# Patient Record
Sex: FEMALE | Race: BLACK OR AFRICAN AMERICAN | NOT HISPANIC OR LATINO | Employment: OTHER | URBAN - METROPOLITAN AREA
[De-identification: names, ages, dates, MRNs, and addresses within clinical notes are randomized per-mention and may not be internally consistent; named-entity substitution may affect disease eponyms.]

---

## 2022-02-15 ENCOUNTER — HOSPITAL ENCOUNTER (OUTPATIENT)
Dept: RADIOLOGY | Facility: HOSPITAL | Age: 67
Discharge: HOME | End: 2022-02-15
Attending: NURSE PRACTITIONER
Payer: MEDICARE

## 2022-02-15 DIAGNOSIS — M25.531 RIGHT WRIST PAIN: ICD-10-CM

## 2022-02-15 DIAGNOSIS — R29.898 DEFICIENCIES OF LIMBS: ICD-10-CM

## 2022-02-15 DIAGNOSIS — M25.531 RIGHT WRIST PAIN: Primary | ICD-10-CM

## 2022-02-15 PROCEDURE — 73110 X-RAY EXAM OF WRIST: CPT | Mod: TC,RT

## 2022-02-16 DIAGNOSIS — R42 DIZZINESS: Primary | ICD-10-CM

## 2022-02-17 DIAGNOSIS — R42 DIZZINESS: Primary | ICD-10-CM

## 2022-02-17 DIAGNOSIS — R42 DIZZINESS AND GIDDINESS: Primary | ICD-10-CM

## 2022-02-17 DIAGNOSIS — R26.81 GAIT INSTABILITY: ICD-10-CM

## 2022-02-17 DIAGNOSIS — R29.6 FREQUENT FALLS: ICD-10-CM

## 2022-02-23 ENCOUNTER — CLINICAL SUPPORT (OUTPATIENT)
Dept: CARDIOLOGY | Facility: HOSPITAL | Age: 67
End: 2022-02-23
Attending: NURSE PRACTITIONER
Payer: MEDICARE

## 2022-02-23 DIAGNOSIS — R42 DIZZINESS: ICD-10-CM

## 2022-02-23 LAB
AORTIC ROOT ANNULUS: 3.32 CM
AORTIC VALVE CUSP SEPERATION: 2.12 CM
AV INDEX (PROSTH): 1.02
AV MEAN GRADIENT: 5 MMHG
AV PEAK GRADIENT: 12 MMHG
AV VALVE AREA: 3.53 CM2
AV VELOCITY RATIO: 85.2
CV ECHO LV RWT: 0.44 CM
DOP CALC AO PEAK VEL: 1.71 M/S
DOP CALC AO VTI: 30.53 CM
DOP CALC LVOT AREA: 3.5 CM2
DOP CALC LVOT DIAMETER: 2.1 CM
DOP CALC LVOT PEAK VEL: 145.69 M/S
DOP CALC LVOT STROKE VOLUME: 107.66 CM3
DOP CALCLVOT PEAK VEL VTI: 31.1 CM
E WAVE DECELERATION TIME: 174.04 MSEC
E/A RATIO: 1.22
E/E' RATIO: 9.26 M/S
ECHO LV POSTERIOR WALL: 0.94 CM (ref 0.6–1.1)
EJECTION FRACTION: 60 %
FRACTIONAL SHORTENING: 40 % (ref 28–44)
INTERVENTRICULAR SEPTUM: 0.98 CM (ref 0.6–1.1)
IVC DIAMETER: 1.6 CM
LEFT ATRIUM SIZE: 3.12 CM
LEFT INTERNAL DIMENSION IN SYSTOLE: 2.55 CM (ref 2.1–4)
LEFT VENTRICLE DIASTOLIC VOLUME: 125 ML
LEFT VENTRICLE SYSTOLIC VOLUME: 54.6 ML
LEFT VENTRICULAR INTERNAL DIMENSION IN DIASTOLE: 4.28 CM (ref 3.5–6)
LEFT VENTRICULAR MASS: 133.66 G
LV LATERAL E/E' RATIO: 7.33 M/S
LV SEPTAL E/E' RATIO: 12.57 M/S
MV PEAK A VEL: 0.72 M/S
MV PEAK E VEL: 0.88 M/S
PISA TR MAX VEL: 2.64 M/S
PV PEAK VELOCITY: 132 CM/S
RA PRESSURE: 3 MMHG
RIGHT VENTRICULAR END-DIASTOLIC DIMENSION: 189 CM
TDI LATERAL: 0.12 M/S
TDI SEPTAL: 0.07 M/S
TDI: 0.1 M/S
TR MAX PG: 28 MMHG
TV REST PULMONARY ARTERY PRESSURE: 31 MMHG

## 2022-02-23 PROCEDURE — 93306 TTE W/DOPPLER COMPLETE: CPT

## 2022-02-23 PROCEDURE — 93306 ECHO (CUPID ONLY): ICD-10-PCS | Mod: 26,,, | Performed by: INTERNAL MEDICINE

## 2022-02-23 PROCEDURE — 93306 TTE W/DOPPLER COMPLETE: CPT | Mod: 26,,, | Performed by: INTERNAL MEDICINE

## 2022-03-02 ENCOUNTER — HOSPITAL ENCOUNTER (OUTPATIENT)
Dept: RADIOLOGY | Facility: HOSPITAL | Age: 67
Discharge: HOME | End: 2022-03-02
Attending: NURSE PRACTITIONER
Payer: MEDICARE

## 2022-03-02 DIAGNOSIS — R42 DIZZINESS: ICD-10-CM

## 2022-03-02 PROCEDURE — 70551 MRI BRAIN STEM W/O DYE: CPT | Mod: TC,PO

## 2022-03-02 PROCEDURE — 70544 MR ANGIOGRAPHY HEAD W/O DYE: CPT | Mod: TC,PO

## 2022-03-04 DIAGNOSIS — R42 DIZZINESS: Primary | ICD-10-CM

## 2022-03-04 DIAGNOSIS — R42 SEVERE VERTIGO: ICD-10-CM

## 2022-03-08 DIAGNOSIS — R42 DIZZINESS: Primary | ICD-10-CM

## 2022-03-08 DIAGNOSIS — R09.89 SYMPTOMS INVOLVING CARDIOVASCULAR SYSTEM: ICD-10-CM

## 2022-03-08 DIAGNOSIS — R29.6 FALLS FREQUENTLY: ICD-10-CM

## 2022-03-08 DIAGNOSIS — R26.81 GAIT INSTABILITY: ICD-10-CM

## 2022-03-08 DIAGNOSIS — J34.89 PAIN, NOSE: ICD-10-CM

## 2022-03-08 DIAGNOSIS — R42 VERTIGO: ICD-10-CM

## 2022-03-09 DIAGNOSIS — R42 DIZZINESS: Primary | ICD-10-CM

## 2022-03-10 ENCOUNTER — OFFICE VISIT (OUTPATIENT)
Dept: ORTHOPEDICS | Facility: CLINIC | Age: 67
End: 2022-03-10
Payer: MEDICARE

## 2022-03-10 VITALS
BODY MASS INDEX: 34.99 KG/M2 | WEIGHT: 210 LBS | DIASTOLIC BLOOD PRESSURE: 77 MMHG | SYSTOLIC BLOOD PRESSURE: 135 MMHG | HEIGHT: 65 IN

## 2022-03-10 DIAGNOSIS — M65.4 DE QUERVAIN'S TENOSYNOVITIS, RIGHT: ICD-10-CM

## 2022-03-10 DIAGNOSIS — M18.11 ARTHRITIS OF CARPOMETACARPAL (CMC) JOINT OF RIGHT THUMB: Primary | ICD-10-CM

## 2022-03-10 PROCEDURE — 3288F PR FALLS RISK ASSESSMENT DOCUMENTED: ICD-10-PCS | Mod: S$GLB,,, | Performed by: ORTHOPAEDIC SURGERY

## 2022-03-10 PROCEDURE — 3078F DIAST BP <80 MM HG: CPT | Mod: S$GLB,,, | Performed by: ORTHOPAEDIC SURGERY

## 2022-03-10 PROCEDURE — 1100F PR PT FALLS ASSESS DOC 2+ FALLS/FALL W/INJURY/YR: ICD-10-PCS | Mod: S$GLB,,, | Performed by: ORTHOPAEDIC SURGERY

## 2022-03-10 PROCEDURE — 3078F PR MOST RECENT DIASTOLIC BLOOD PRESSURE < 80 MM HG: ICD-10-PCS | Mod: S$GLB,,, | Performed by: ORTHOPAEDIC SURGERY

## 2022-03-10 PROCEDURE — 4010F PR ACE/ARB THEARPY RXD/TAKEN: ICD-10-PCS | Mod: S$GLB,,, | Performed by: ORTHOPAEDIC SURGERY

## 2022-03-10 PROCEDURE — 1159F PR MEDICATION LIST DOCUMENTED IN MEDICAL RECORD: ICD-10-PCS | Mod: S$GLB,,, | Performed by: ORTHOPAEDIC SURGERY

## 2022-03-10 PROCEDURE — 20550 TENDON SHEATH: ICD-10-PCS | Mod: RT,S$GLB,, | Performed by: ORTHOPAEDIC SURGERY

## 2022-03-10 PROCEDURE — 20550 NJX 1 TENDON SHEATH/LIGAMENT: CPT | Mod: RT,S$GLB,, | Performed by: ORTHOPAEDIC SURGERY

## 2022-03-10 PROCEDURE — 3008F BODY MASS INDEX DOCD: CPT | Mod: S$GLB,,, | Performed by: ORTHOPAEDIC SURGERY

## 2022-03-10 PROCEDURE — 1159F MED LIST DOCD IN RCRD: CPT | Mod: S$GLB,,, | Performed by: ORTHOPAEDIC SURGERY

## 2022-03-10 PROCEDURE — 1160F PR REVIEW ALL MEDS BY PRESCRIBER/CLIN PHARMACIST DOCUMENTED: ICD-10-PCS | Mod: S$GLB,,, | Performed by: ORTHOPAEDIC SURGERY

## 2022-03-10 PROCEDURE — 3075F SYST BP GE 130 - 139MM HG: CPT | Mod: S$GLB,,, | Performed by: ORTHOPAEDIC SURGERY

## 2022-03-10 PROCEDURE — 99203 OFFICE O/P NEW LOW 30 MIN: CPT | Mod: 25,S$GLB,, | Performed by: ORTHOPAEDIC SURGERY

## 2022-03-10 PROCEDURE — 99203 PR OFFICE/OUTPT VISIT, NEW, LEVL III, 30-44 MIN: ICD-10-PCS | Mod: 25,S$GLB,, | Performed by: ORTHOPAEDIC SURGERY

## 2022-03-10 PROCEDURE — 1100F PTFALLS ASSESS-DOCD GE2>/YR: CPT | Mod: S$GLB,,, | Performed by: ORTHOPAEDIC SURGERY

## 2022-03-10 PROCEDURE — 3075F PR MOST RECENT SYSTOLIC BLOOD PRESS GE 130-139MM HG: ICD-10-PCS | Mod: S$GLB,,, | Performed by: ORTHOPAEDIC SURGERY

## 2022-03-10 PROCEDURE — 4010F ACE/ARB THERAPY RXD/TAKEN: CPT | Mod: S$GLB,,, | Performed by: ORTHOPAEDIC SURGERY

## 2022-03-10 PROCEDURE — 3008F PR BODY MASS INDEX (BMI) DOCUMENTED: ICD-10-PCS | Mod: S$GLB,,, | Performed by: ORTHOPAEDIC SURGERY

## 2022-03-10 PROCEDURE — 1125F AMNT PAIN NOTED PAIN PRSNT: CPT | Mod: S$GLB,,, | Performed by: ORTHOPAEDIC SURGERY

## 2022-03-10 PROCEDURE — 1160F RVW MEDS BY RX/DR IN RCRD: CPT | Mod: S$GLB,,, | Performed by: ORTHOPAEDIC SURGERY

## 2022-03-10 PROCEDURE — 1125F PR PAIN SEVERITY QUANTIFIED, PAIN PRESENT: ICD-10-PCS | Mod: S$GLB,,, | Performed by: ORTHOPAEDIC SURGERY

## 2022-03-10 PROCEDURE — 3288F FALL RISK ASSESSMENT DOCD: CPT | Mod: S$GLB,,, | Performed by: ORTHOPAEDIC SURGERY

## 2022-03-10 RX ORDER — ROSUVASTATIN CALCIUM 10 MG/1
10 TABLET, COATED ORAL NIGHTLY
COMMUNITY
Start: 2022-02-11

## 2022-03-10 RX ORDER — MECLIZINE HYDROCHLORIDE 25 MG/1
TABLET ORAL
COMMUNITY

## 2022-03-10 RX ORDER — TRIAMCINOLONE ACETONIDE 40 MG/ML
40 INJECTION, SUSPENSION INTRA-ARTICULAR; INTRAMUSCULAR
Status: DISCONTINUED | OUTPATIENT
Start: 2022-03-10 | End: 2022-03-10 | Stop reason: HOSPADM

## 2022-03-10 RX ORDER — OMEPRAZOLE 10 MG/1
10 CAPSULE, DELAYED RELEASE ORAL EVERY MORNING
COMMUNITY
End: 2022-07-27

## 2022-03-10 RX ORDER — CLONAZEPAM 0.5 MG/1
TABLET ORAL
COMMUNITY
Start: 2021-11-27

## 2022-03-10 RX ORDER — CYCLOSPORINE 0.5 MG/ML
EMULSION OPHTHALMIC
COMMUNITY

## 2022-03-10 RX ORDER — ALBUTEROL SULFATE 90 UG/1
1 AEROSOL, METERED RESPIRATORY (INHALATION) EVERY 4 HOURS PRN
COMMUNITY
Start: 2022-02-11

## 2022-03-10 RX ORDER — TELMISARTAN 40 MG/1
TABLET ORAL
COMMUNITY

## 2022-03-10 RX ORDER — SERTRALINE HYDROCHLORIDE 100 MG/1
TABLET, FILM COATED ORAL
COMMUNITY
End: 2022-08-01

## 2022-03-10 RX ORDER — TRAZODONE HYDROCHLORIDE 100 MG/1
TABLET ORAL
COMMUNITY

## 2022-03-10 RX ORDER — PHENYLPROPANOLAMINE/CLEMASTINE 75-1.34MG
TABLET, EXTENDED RELEASE ORAL
COMMUNITY

## 2022-03-10 RX ORDER — PANTOPRAZOLE SODIUM 20 MG/1
20 TABLET, DELAYED RELEASE ORAL DAILY
COMMUNITY
Start: 2022-02-11 | End: 2022-08-23 | Stop reason: SDUPTHER

## 2022-03-10 RX ADMIN — TRIAMCINOLONE ACETONIDE 40 MG: 40 INJECTION, SUSPENSION INTRA-ARTICULAR; INTRAMUSCULAR at 08:03

## 2022-03-10 NOTE — PROCEDURES
Tendon Sheath    Date/Time: 3/10/2022 8:00 AM  Performed by: Roque Alves MD  Authorized by: Roque Alves MD     Consent Done?:  Yes (Verbal)  Indications:  Pain  Site marked: the procedure site was marked    Timeout: prior to procedure the correct patient, procedure, and site was verified    Prep: patient was prepped and draped in usual sterile fashion      Local anesthesia used?: Yes    Anesthesia:  Local infiltration  Local anesthetic:  Lidocaine 1% without epinephrine  Location:  Wrist  Site:  R first doral compartment  Ultrasonic guidance for needle placement?: No    Needle size:  25 G  Medications:  40 mg triamcinolone acetonide 40 mg/mL  Patient tolerance:  Patient tolerated the procedure well with no immediate complications

## 2022-03-10 NOTE — PROGRESS NOTES
[facility]    Subjective:     Chief Complaint:   Chief Complaint   Patient presents with    Right Wrist - Injury, Pain     Fell on hand 02/15/22. Since fall, she has been unable to comfortably flex and extend wrist. States pain radiates up her arm         Past Medical History:   Diagnosis Date    Carpal tunnel syndrome     Diverticulitis        Past Surgical History:   Procedure Laterality Date    ABDOMINAL SURGERY      bladder lift    CARPAL TUNNEL RELEASE      RT CTR    cystomoty      HERNIA MESH REMOVAL N/A     HYSTERECTOMY      KNEE SURGERY      BL TKA    SHOULDER SURGERY      TSA right       Current Outpatient Medications   Medication Sig    albuterol (PROVENTIL/VENTOLIN HFA) 90 mcg/actuation inhaler 1 puff every 4 (four) hours as needed.    clonazePAM (KLONOPIN) 0.5 MG tablet     cycloSPORINE (RESTASIS) 0.05 % ophthalmic emulsion 1 drop into affected eye    ibuprofen 200 mg Cap ibuprofen    meclizine (ANTIVERT) 25 mg tablet 1 tablet as needed    omeprazole (PRILOSEC) 10 MG capsule omeprazole    pantoprazole (PROTONIX) 20 MG tablet TAKE 1 TABLET BY MOUTH AS NEEDED FOR 90 DAYS    potassium 99 mg Tab potassium    rosuvastatin (CRESTOR) 10 MG tablet Take 10 mg by mouth every evening.    sertraline (ZOLOFT) 100 MG tablet 1 tablet    telmisartan (MICARDIS) 40 MG Tab 1 tablet    traZODone (DESYREL) 100 MG tablet 1 tablet at bedtime    vitamin B complex (B COMPLEX 1 ORAL) B Complex     No current facility-administered medications for this visit.       Review of patient's allergies indicates:   Allergen Reactions    Codeine      Other reaction(s): Unknown    Penicillins      Other reaction(s): Unknown       Family History   Family history unknown: Yes       Social History     Socioeconomic History    Marital status: Single   Tobacco Use    Smoking status: Never Smoker    Smokeless tobacco: Never Used   Substance and Sexual Activity    Alcohol use: Never    Drug use: Never        History of present illness:  Ms. Acosta presents to the clinic today as a new patient with a chief complaint of right wrist/hand pain after a fall approximately 2 months ago.  She reports she fell backwards and struck the dorsum of her right hand when she fell.    Review of Systems:    Constitution: Negative for chills, fever, and sweats.  Negative for unexplained weight loss.    HENT:  Negative for headaches and blurry vision.    Cardiovascular:Negative for chest pain or irregular heart beat. Negative for hypertension.    Respiratory:  Negative for cough and shortness of breath.    Gastrointestinal: Negative for abdominal pain, heartburn, melena, nausea, and vomitting.    Genitourinary:  Negative bladder incontinence and dysuria.    Musculoskeletal:  See HPI for details.     Neurological: Negative for numbness.    Psychiatric/Behavioral: Negative for depression.  The patient is not nervous/anxious.      Endocrine: Negative for polyuria    Hematologic/Lymphatic: Negative for bleeding problem.  Does not bruise/bleed easily.    Skin: Negative for poor would healing and rash    Objective:      Physical Examination:    Vital Signs:    Vitals:    03/10/22 0746   BP: 135/77       Body mass index is 34.95 kg/m².    This a well-developed, well nourished patient in no acute distress.  They are alert and oriented and cooperative to examination.        Right hand exam:  Skin to right hand is clean dry and intact.  There is no erythema or ecchymosis.  There are no signs or symptoms of infection.  Patient is neurovascularly intact throughout the right upper extremity.  She can fully open and close right hand into a fist.  She can oppose her right thumb to all digits in her right hand.   strength in her right hand with grade 5/5 and is equal bilaterally.  She has no anatomical snuffbox tenderness.  She does have tenderness along the 1st dorsal compartment of her right thumb.  She does have a positive Finkelstein's.  She  "also has tenderness to palpation about the right thumb CMC joint.  She does have a positive basilar grind as well.    Pertinent New Results:    XRAY Report / Interpretation:   Three views were taken of her right hand today:  AP, lateral oblique views.  They reveal no acute fractures or dislocations.  Visualized soft tissues are unremarkable.  Patient does have mild arthrosis of the CMC joint of her right thumb.    Assessment/Plan:      1.  Right thumb basilar arthrosis.  2.  Right thumb de Quervain tenosynovitis.    Patient has signs and symptoms consistent with both diagnoses listed above.  Dr. Alves injected both areas today with 40 mg of Kenalog and lidocaine.  She tolerated this well.  We will also place her in a carpal tunnel brace to rest/immobilize the wrist for 1 week.  We will have her follow-up in 1 month if she is not improved.    Brian Caro, Physician Assistant, served in the capacity as a "scribe" for this patient encounter.  A "face-to-face" encounter occurred with Dr. Roque Alves on this date.  The treatment plan and medical decision-making is outlined above. Patient was seen and examined with a chaperone.       This note was created using Dragon voice recognition software that occasionally misinterpreted phrases or words.          "

## 2022-03-15 ENCOUNTER — HOSPITAL ENCOUNTER (OUTPATIENT)
Dept: RADIOLOGY | Facility: HOSPITAL | Age: 67
Discharge: HOME | End: 2022-03-15
Attending: NURSE PRACTITIONER
Payer: MEDICARE

## 2022-03-15 DIAGNOSIS — R26.81 GAIT INSTABILITY: ICD-10-CM

## 2022-03-15 DIAGNOSIS — R29.6 FALLS FREQUENTLY: ICD-10-CM

## 2022-03-15 DIAGNOSIS — R42 DIZZINESS: ICD-10-CM

## 2022-03-15 DIAGNOSIS — R42 VERTIGO: ICD-10-CM

## 2022-03-15 DIAGNOSIS — J34.89 PAIN, NOSE: ICD-10-CM

## 2022-03-15 DIAGNOSIS — R09.89 SYMPTOMS INVOLVING CARDIOVASCULAR SYSTEM: ICD-10-CM

## 2022-03-15 PROCEDURE — 93880 EXTRACRANIAL BILAT STUDY: CPT | Mod: TC,PO

## 2022-03-24 ENCOUNTER — TELEPHONE (OUTPATIENT)
Dept: CARDIOLOGY | Facility: HOSPITAL | Age: 67
End: 2022-03-24
Payer: MEDICAID

## 2022-03-25 ENCOUNTER — CLINICAL SUPPORT (OUTPATIENT)
Dept: CARDIOLOGY | Facility: HOSPITAL | Age: 67
End: 2022-03-25
Attending: NURSE PRACTITIONER
Payer: MEDICARE

## 2022-03-25 DIAGNOSIS — R42 SEVERE VERTIGO: ICD-10-CM

## 2022-03-25 DIAGNOSIS — R42 DIZZINESS: ICD-10-CM

## 2022-03-25 PROCEDURE — 93306 TTE W/DOPPLER COMPLETE: CPT | Mod: 26,,, | Performed by: INTERNAL MEDICINE

## 2022-03-25 PROCEDURE — 93306 ECHO (CUPID ONLY): ICD-10-PCS | Mod: 26,,, | Performed by: INTERNAL MEDICINE

## 2022-03-25 PROCEDURE — 93306 TTE W/DOPPLER COMPLETE: CPT

## 2022-03-30 LAB
AORTIC ROOT ANNULUS: 2.92 CM
AORTIC VALVE CUSP SEPERATION: 1.87 CM
AV INDEX (PROSTH): 0.73
AV MEAN GRADIENT: 9 MMHG
AV PEAK GRADIENT: 19 MMHG
AV VALVE AREA: 2.31 CM2
AV VELOCITY RATIO: 70.45
CV ECHO LV RWT: 0.41 CM
DOP CALC AO PEAK VEL: 2.16 M/S
DOP CALC AO VTI: 43.08 CM
DOP CALC LVOT AREA: 3.2 CM2
DOP CALC LVOT DIAMETER: 2.01 CM
DOP CALC LVOT PEAK VEL: 152.17 M/S
DOP CALC LVOT STROKE VOLUME: 99.36 CM3
DOP CALCLVOT PEAK VEL VTI: 31.33 CM
E WAVE DECELERATION TIME: 207.19 MSEC
E/A RATIO: 1.54
E/E' RATIO: 12.13 M/S
ECHO LV POSTERIOR WALL: 1.02 CM (ref 0.6–1.1)
EJECTION FRACTION: 60 %
FRACTIONAL SHORTENING: 33 % (ref 28–44)
INTERVENTRICULAR SEPTUM: 1.06 CM (ref 0.6–1.1)
LEFT ATRIUM SIZE: 3.73 CM
LEFT INTERNAL DIMENSION IN SYSTOLE: 3.33 CM (ref 2.1–4)
LEFT VENTRICULAR INTERNAL DIMENSION IN DIASTOLE: 4.94 CM (ref 3.5–6)
LEFT VENTRICULAR MASS: 188.13 G
LV LATERAL E/E' RATIO: 9.1 M/S
LV SEPTAL E/E' RATIO: 18.2 M/S
MV PEAK A VEL: 0.59 M/S
MV PEAK E VEL: 0.91 M/S
PISA TR MAX VEL: 2.33 M/S
PV PEAK VELOCITY: 105.6 CM/S
RA PRESSURE: 3 MMHG
RIGHT VENTRICULAR END-DIASTOLIC DIMENSION: 245 CM
TDI LATERAL: 0.1 M/S
TDI SEPTAL: 0.05 M/S
TDI: 0.08 M/S
TR MAX PG: 22 MMHG
TV REST PULMONARY ARTERY PRESSURE: 25 MMHG

## 2022-05-05 ENCOUNTER — OFFICE VISIT (OUTPATIENT)
Dept: ORTHOPEDICS | Facility: CLINIC | Age: 67
End: 2022-05-05
Payer: MEDICARE

## 2022-05-05 VITALS — WEIGHT: 210 LBS | HEIGHT: 65 IN | BODY MASS INDEX: 34.99 KG/M2

## 2022-05-05 DIAGNOSIS — M75.42 IMPINGEMENT SYNDROME OF LEFT SHOULDER: Primary | ICD-10-CM

## 2022-05-05 PROCEDURE — 1159F PR MEDICATION LIST DOCUMENTED IN MEDICAL RECORD: ICD-10-PCS | Mod: CPTII,S$GLB,, | Performed by: ORTHOPAEDIC SURGERY

## 2022-05-05 PROCEDURE — 1159F MED LIST DOCD IN RCRD: CPT | Mod: CPTII,S$GLB,, | Performed by: ORTHOPAEDIC SURGERY

## 2022-05-05 PROCEDURE — 3288F PR FALLS RISK ASSESSMENT DOCUMENTED: ICD-10-PCS | Mod: CPTII,S$GLB,, | Performed by: ORTHOPAEDIC SURGERY

## 2022-05-05 PROCEDURE — 1125F PR PAIN SEVERITY QUANTIFIED, PAIN PRESENT: ICD-10-PCS | Mod: CPTII,S$GLB,, | Performed by: ORTHOPAEDIC SURGERY

## 2022-05-05 PROCEDURE — 3008F PR BODY MASS INDEX (BMI) DOCUMENTED: ICD-10-PCS | Mod: CPTII,S$GLB,, | Performed by: ORTHOPAEDIC SURGERY

## 2022-05-05 PROCEDURE — 3008F BODY MASS INDEX DOCD: CPT | Mod: CPTII,S$GLB,, | Performed by: ORTHOPAEDIC SURGERY

## 2022-05-05 PROCEDURE — 4010F PR ACE/ARB THEARPY RXD/TAKEN: ICD-10-PCS | Mod: CPTII,S$GLB,, | Performed by: ORTHOPAEDIC SURGERY

## 2022-05-05 PROCEDURE — 1100F PR PT FALLS ASSESS DOC 2+ FALLS/FALL W/INJURY/YR: ICD-10-PCS | Mod: CPTII,S$GLB,, | Performed by: ORTHOPAEDIC SURGERY

## 2022-05-05 PROCEDURE — 3288F FALL RISK ASSESSMENT DOCD: CPT | Mod: CPTII,S$GLB,, | Performed by: ORTHOPAEDIC SURGERY

## 2022-05-05 PROCEDURE — 1100F PTFALLS ASSESS-DOCD GE2>/YR: CPT | Mod: CPTII,S$GLB,, | Performed by: ORTHOPAEDIC SURGERY

## 2022-05-05 PROCEDURE — 1160F RVW MEDS BY RX/DR IN RCRD: CPT | Mod: CPTII,S$GLB,, | Performed by: ORTHOPAEDIC SURGERY

## 2022-05-05 PROCEDURE — 99213 PR OFFICE/OUTPT VISIT, EST, LEVL III, 20-29 MIN: ICD-10-PCS | Mod: S$GLB,,, | Performed by: ORTHOPAEDIC SURGERY

## 2022-05-05 PROCEDURE — 4010F ACE/ARB THERAPY RXD/TAKEN: CPT | Mod: CPTII,S$GLB,, | Performed by: ORTHOPAEDIC SURGERY

## 2022-05-05 PROCEDURE — 1160F PR REVIEW ALL MEDS BY PRESCRIBER/CLIN PHARMACIST DOCUMENTED: ICD-10-PCS | Mod: CPTII,S$GLB,, | Performed by: ORTHOPAEDIC SURGERY

## 2022-05-05 PROCEDURE — 1125F AMNT PAIN NOTED PAIN PRSNT: CPT | Mod: CPTII,S$GLB,, | Performed by: ORTHOPAEDIC SURGERY

## 2022-05-05 PROCEDURE — 99213 OFFICE O/P EST LOW 20 MIN: CPT | Mod: S$GLB,,, | Performed by: ORTHOPAEDIC SURGERY

## 2022-05-05 NOTE — PROGRESS NOTES
[facility]    Subjective:     Chief Complaint:   Chief Complaint   Patient presents with    Right Shoulder - Pain     Pt is here with complaints of Left Shoulder pain x 1 month had a fall, Hx of RTC repair. ROM is limited & painful    Right Hand - Pain     Pain and weakness in right hand x 3 months. Had injection that helped, states it gets stuck.        Past Medical History:   Diagnosis Date    Carpal tunnel syndrome     Diverticulitis        Past Surgical History:   Procedure Laterality Date    ABDOMINAL SURGERY      bladder lift    CARPAL TUNNEL RELEASE      RT CTR    cystomoty      HERNIA MESH REMOVAL N/A     HYSTERECTOMY      KNEE SURGERY      BL TKA    SHOULDER SURGERY      TSA right       Current Outpatient Medications   Medication Sig    albuterol (PROVENTIL/VENTOLIN HFA) 90 mcg/actuation inhaler 1 puff every 4 (four) hours as needed.    clonazePAM (KLONOPIN) 0.5 MG tablet     cycloSPORINE (RESTASIS) 0.05 % ophthalmic emulsion 1 drop into affected eye    ibuprofen 200 mg Cap ibuprofen    meclizine (ANTIVERT) 25 mg tablet 1 tablet as needed    omeprazole (PRILOSEC) 10 MG capsule omeprazole    pantoprazole (PROTONIX) 20 MG tablet TAKE 1 TABLET BY MOUTH AS NEEDED FOR 90 DAYS    potassium 99 mg Tab potassium    sertraline (ZOLOFT) 100 MG tablet 1 tablet    telmisartan (MICARDIS) 40 MG Tab 1 tablet    traZODone (DESYREL) 100 MG tablet 1 tablet at bedtime    vitamin B complex (B COMPLEX 1 ORAL) B Complex    rosuvastatin (CRESTOR) 10 MG tablet Take 10 mg by mouth every evening.     No current facility-administered medications for this visit.       Review of patient's allergies indicates:   Allergen Reactions    Codeine      Other reaction(s): Unknown    Penicillins      Other reaction(s): Unknown       Family History   Family history unknown: Yes       Social History     Socioeconomic History    Marital status: Single   Tobacco Use    Smoking status: Never Smoker     Smokeless tobacco: Never Used   Substance and Sexual Activity    Alcohol use: Never    Drug use: Never       History of present illness:  Patient comes in today for the left shoulder.  Her left shoulder pain bothering her for about 3-4 weeks.  She is not sure why it started.  It is severe at times.  She has measured both discomfort      Review of Systems:    Constitution: Negative for chills, fever, and sweats.  Negative for unexplained weight loss.    HENT:  Negative for headaches and blurry vision.    Cardiovascular:Negative for chest pain or irregular heart beat. Negative for hypertension.    Respiratory:  Negative for cough and shortness of breath.    Gastrointestinal: Negative for abdominal pain, heartburn, melena, nausea, and vomitting.    Genitourinary:  Negative bladder incontinence and dysuria.    Musculoskeletal:  See HPI for details.     Neurological: Negative for numbness.    Psychiatric/Behavioral: Negative for depression.  The patient is not nervous/anxious.      Endocrine: Negative for polyuria    Hematologic/Lymphatic: Negative for bleeding problem.  Does not bruise/bleed easily.    Skin: Negative for poor would healing and rash    Objective:      Physical Examination:    Vital Signs:  There were no vitals filed for this visit.    Body mass index is 34.95 kg/m².    This a well-developed, well nourished patient in no acute distress.  They are alert and oriented and cooperative to examination.        Patient has full range of motion of the left shoulder.  Very positive impingement.  Her rotator cuffs much weaker than the contralateral side.  She does have old healed portals consistent with prior shoulder surgery.  Full range of motion of the right shoulder without difficulty.  Pertinent New Results:    XRAY Report / Interpretation:   Two views of the left shoulder demonstrates some calcification around the insertion of the rotator cuff.  Not classic chondrocalcinosis but certainly some  calcifications.  She is noted to have a type 1 acromion    Assessment/Plan:      Subacromial bursitis.  I injected the subacromial space with Kenalog and lidocaine.  I started her on some physical therapy I will check her back in a month if she is not much better I will get an MRI      This note was created using Dragon voice recognition software that occasionally misinterpreted phrases or words.

## 2022-05-11 NOTE — PROGRESS NOTES
CHIEF COMPLAINT:    Mrs Tejeda is a 66 y.o. female presenting for urinary frequency, urgency and UUI.  PRESENTING ILLNESS:    Karla Tejeda is a 66 y.o. female with a PMH of HTN, vertigo who presents for urinary frequency, urgency and UUI. This is her initial clinic visit.     2006 Prolapse, vaginal repair: Rectocele repair; Total prolift  2000 Total hysterectomy    Today patient presents to clinic for urinary frequency, urgency, nocturia and UUI. She reports daytime frequency every 30 minutes to 1 hour and nocturia x 4-5. She is unable to hold urine once urge occurs. She has not been leaving her home in fear of an incontinence episode in public. Symptoms have been occurring for the past 4-5 months. No URIAH. Denies dysuria, gross hematuria, flank pain, fever, chills, nausea or vomiting.    Drinks: 1 gallon water per day. No coffee. No soda. Occasional tea.  Constipation: none    History of kidney stones: none  History of recurrent UTI: none  Personal or family hx of  malignancy: none  History of  trauma: none  Smoking history: never    Urine cultures:   No results found for: LABURIN    REVIEW OF SYSTEMS:    Review of Systems    Constitutional: Negative for fever and chills.   HENT: Negative for hearing loss.   Eyes: Negative for visual disturbance.   Respiratory: Negative for shortness of breath.   Cardiovascular: Negative for chest pain.   Gastrointestinal: Negative for nausea, vomiting, and constipation.   Genitourinary:  See above  Neurological: Negative for dizziness.   Hematological: Does not bruise/bleed easily.   Psychiatric/Behavioral: Negative for confusion.     PATIENT HISTORY:    Past Medical History:   Diagnosis Date    Carpal tunnel syndrome     Diverticulitis        Past Surgical History:   Procedure Laterality Date    ABDOMINAL SURGERY      bladder lift    CARPAL TUNNEL RELEASE      RT CTR    cystomoty      HERNIA MESH REMOVAL N/A     HYSTERECTOMY      KNEE SURGERY      BL TKA    SHOULDER  SURGERY      TSA right       Family History   Family history unknown: Yes       Social History     Socioeconomic History    Marital status: Single   Tobacco Use    Smoking status: Never Smoker    Smokeless tobacco: Never Used   Substance and Sexual Activity    Alcohol use: Never    Drug use: Never       Allergies:  Codeine and Penicillins    Medications:    Current Outpatient Medications:     albuterol (PROVENTIL/VENTOLIN HFA) 90 mcg/actuation inhaler, 1 puff every 4 (four) hours as needed., Disp: , Rfl:     clonazePAM (KLONOPIN) 0.5 MG tablet, , Disp: , Rfl:     cycloSPORINE (RESTASIS) 0.05 % ophthalmic emulsion, 1 drop into affected eye, Disp: , Rfl:     gabapentin (NEURONTIN) 100 MG capsule, 1 capsule, Disp: , Rfl:     ibuprofen 200 mg Cap, ibuprofen, Disp: , Rfl:     meclizine (ANTIVERT) 25 mg tablet, 1 tablet as needed, Disp: , Rfl:     omeprazole (PRILOSEC) 10 MG capsule, omeprazole, Disp: , Rfl:     pantoprazole (PROTONIX) 20 MG tablet, TAKE 1 TABLET BY MOUTH AS NEEDED FOR 90 DAYS, Disp: , Rfl:     potassium 99 mg Tab, potassium, Disp: , Rfl:     rosuvastatin (CRESTOR) 10 MG tablet, Take 10 mg by mouth every evening., Disp: , Rfl:     sertraline (ZOLOFT) 100 MG tablet, 1 tablet, Disp: , Rfl:     telmisartan (MICARDIS) 40 MG Tab, 1 tablet, Disp: , Rfl:     traZODone (DESYREL) 100 MG tablet, 1 tablet at bedtime, Disp: , Rfl:     vitamin B complex (B COMPLEX 1 ORAL), B Complex, Disp: , Rfl:     solifenacin (VESICARE) 5 MG tablet, Take 1 tablet (5 mg total) by mouth once daily., Disp: 30 tablet, Rfl: 3    PHYSICAL EXAMINATION:    Constitutional: She is oriented to person, place, and time. She appears well-developed and well-nourished.  She is in no apparent distress.    Neck: Normal ROM.     Cardiovascular: Normal rate.      Pulmonary/Chest: Effort normal. No respiratory distress.     Abdominal:  She exhibits no distension.  There is no CVA tenderness.     Neurological: She is alert and  oriented to person, place, and time.     Skin: Skin is warm and dry.     Psych: Cooperative with normal affect.    Genitourinary:  Normal external female genitalia  Urethral meatus is normal    Consent verbally obtained.  Betadine prep was applied to the urethral meatus. An in and out cath was performed after voiding without difficulty.  The PVR was 40 ml.      Physical Exam      LABS:    U/a: sp grav 1.025, pH 5.5, trace ketones, small leuk, otherwise negative    No results found for: CREATININE    IMPRESSION:    Encounter Diagnoses   Name Primary?    Urge incontinence Yes    Urinary frequency     Urinary urgency     Nocturia        PLAN:  -Catheterized urine specimen sent for UA and UC  -Discussed OAB and symptoms associated with it. Discussed conservative/behavioral modifications to help improve symptoms. Patient is interested in treatment with medical therapy. D/t issues with vertigo, will try vesicare to avoid dizziness/confusion with oxybutynin.  Discussed side effects, indications, and MOA for vesicare. Prescription sent to the pharmacy. Pt verbalized understanding.   -RTC 6-8 weeks to evaluate symptoms with medication use    I encouraged her or any of her family members to call or email me with questions and/or concerns.      45 minutes of total time spent on the encounter, which includes face to face time and non-face to face time preparing to see the patient (eg, review of tests), Obtaining and/or reviewing separately obtained history, Documenting clinical information in the electronic or other health record, Independently interpreting results (not separately reported) and communicating results to the patient/family/caregiver, or Care coordination (not separately reported).

## 2022-05-12 ENCOUNTER — OFFICE VISIT (OUTPATIENT)
Dept: UROLOGY | Facility: CLINIC | Age: 67
End: 2022-05-12
Payer: MEDICARE

## 2022-05-12 VITALS
WEIGHT: 222.69 LBS | DIASTOLIC BLOOD PRESSURE: 86 MMHG | BODY MASS INDEX: 37.1 KG/M2 | RESPIRATION RATE: 18 BRPM | HEART RATE: 60 BPM | SYSTOLIC BLOOD PRESSURE: 144 MMHG | HEIGHT: 65 IN

## 2022-05-12 DIAGNOSIS — R35.0 URINARY FREQUENCY: ICD-10-CM

## 2022-05-12 DIAGNOSIS — R39.15 URINARY URGENCY: ICD-10-CM

## 2022-05-12 DIAGNOSIS — R35.1 NOCTURIA: ICD-10-CM

## 2022-05-12 DIAGNOSIS — N39.41 URGE INCONTINENCE: Primary | ICD-10-CM

## 2022-05-12 LAB
BACTERIA #/AREA URNS HPF: NORMAL /HPF
BILIRUB SERPL-MCNC: ABNORMAL MG/DL
BILIRUB UR QL STRIP: NEGATIVE
BLOOD URINE, POC: ABNORMAL
CLARITY UR: CLEAR
CLARITY, POC UA: CLEAR
COLOR UR: YELLOW
COLOR, POC UA: YELLOW
GLUCOSE UR QL STRIP: ABNORMAL
GLUCOSE UR QL STRIP: NEGATIVE
HGB UR QL STRIP: NEGATIVE
KETONES UR QL STRIP: ABNORMAL
KETONES UR QL STRIP: NEGATIVE
LEUKOCYTE ESTERASE UR QL STRIP: NEGATIVE
LEUKOCYTE ESTERASE URINE, POC: ABNORMAL
MICROSCOPIC COMMENT: NORMAL
NITRITE UR QL STRIP: NEGATIVE
NITRITE, POC UA: ABNORMAL
PH UR STRIP: 6 [PH] (ref 5–8)
PH, POC UA: 5.5
PROT UR QL STRIP: NEGATIVE
PROTEIN, POC: ABNORMAL
SP GR UR STRIP: 1.02 (ref 1–1.03)
SPECIFIC GRAVITY, POC UA: 1.02
URN SPEC COLLECT METH UR: NORMAL
UROBILINOGEN UR STRIP-ACNC: NEGATIVE EU/DL
UROBILINOGEN, POC UA: 0.2

## 2022-05-12 PROCEDURE — 3079F DIAST BP 80-89 MM HG: CPT | Mod: CPTII,S$GLB,, | Performed by: NURSE PRACTITIONER

## 2022-05-12 PROCEDURE — 81002 URINALYSIS NONAUTO W/O SCOPE: CPT | Mod: S$GLB,,, | Performed by: NURSE PRACTITIONER

## 2022-05-12 PROCEDURE — 51701 INSERT BLADDER CATHETER: CPT | Mod: S$GLB,,, | Performed by: NURSE PRACTITIONER

## 2022-05-12 PROCEDURE — 81000 URINALYSIS NONAUTO W/SCOPE: CPT | Performed by: NURSE PRACTITIONER

## 2022-05-12 PROCEDURE — 81002 POCT URINE DIPSTICK WITHOUT MICROSCOPE: ICD-10-PCS | Mod: S$GLB,,, | Performed by: NURSE PRACTITIONER

## 2022-05-12 PROCEDURE — 99999 PR PBB SHADOW E&M-EST. PATIENT-LVL IV: CPT | Mod: PBBFAC,,, | Performed by: NURSE PRACTITIONER

## 2022-05-12 PROCEDURE — 3288F FALL RISK ASSESSMENT DOCD: CPT | Mod: CPTII,S$GLB,, | Performed by: NURSE PRACTITIONER

## 2022-05-12 PROCEDURE — 99999 PR PBB SHADOW E&M-EST. PATIENT-LVL IV: ICD-10-PCS | Mod: PBBFAC,,, | Performed by: NURSE PRACTITIONER

## 2022-05-12 PROCEDURE — 1160F PR REVIEW ALL MEDS BY PRESCRIBER/CLIN PHARMACIST DOCUMENTED: ICD-10-PCS | Mod: CPTII,S$GLB,, | Performed by: NURSE PRACTITIONER

## 2022-05-12 PROCEDURE — 1126F PR PAIN SEVERITY QUANTIFIED, NO PAIN PRESENT: ICD-10-PCS | Mod: CPTII,S$GLB,, | Performed by: NURSE PRACTITIONER

## 2022-05-12 PROCEDURE — 87086 URINE CULTURE/COLONY COUNT: CPT | Performed by: NURSE PRACTITIONER

## 2022-05-12 PROCEDURE — 3079F PR MOST RECENT DIASTOLIC BLOOD PRESSURE 80-89 MM HG: ICD-10-PCS | Mod: CPTII,S$GLB,, | Performed by: NURSE PRACTITIONER

## 2022-05-12 PROCEDURE — 3288F PR FALLS RISK ASSESSMENT DOCUMENTED: ICD-10-PCS | Mod: CPTII,S$GLB,, | Performed by: NURSE PRACTITIONER

## 2022-05-12 PROCEDURE — 4010F PR ACE/ARB THEARPY RXD/TAKEN: ICD-10-PCS | Mod: CPTII,S$GLB,, | Performed by: NURSE PRACTITIONER

## 2022-05-12 PROCEDURE — 1100F PR PT FALLS ASSESS DOC 2+ FALLS/FALL W/INJURY/YR: ICD-10-PCS | Mod: CPTII,S$GLB,, | Performed by: NURSE PRACTITIONER

## 2022-05-12 PROCEDURE — 3008F PR BODY MASS INDEX (BMI) DOCUMENTED: ICD-10-PCS | Mod: CPTII,S$GLB,, | Performed by: NURSE PRACTITIONER

## 2022-05-12 PROCEDURE — 1159F MED LIST DOCD IN RCRD: CPT | Mod: CPTII,S$GLB,, | Performed by: NURSE PRACTITIONER

## 2022-05-12 PROCEDURE — 1159F PR MEDICATION LIST DOCUMENTED IN MEDICAL RECORD: ICD-10-PCS | Mod: CPTII,S$GLB,, | Performed by: NURSE PRACTITIONER

## 2022-05-12 PROCEDURE — 99204 OFFICE O/P NEW MOD 45 MIN: CPT | Mod: 25,S$GLB,, | Performed by: NURSE PRACTITIONER

## 2022-05-12 PROCEDURE — 1100F PTFALLS ASSESS-DOCD GE2>/YR: CPT | Mod: CPTII,S$GLB,, | Performed by: NURSE PRACTITIONER

## 2022-05-12 PROCEDURE — 3077F SYST BP >= 140 MM HG: CPT | Mod: CPTII,S$GLB,, | Performed by: NURSE PRACTITIONER

## 2022-05-12 PROCEDURE — 4010F ACE/ARB THERAPY RXD/TAKEN: CPT | Mod: CPTII,S$GLB,, | Performed by: NURSE PRACTITIONER

## 2022-05-12 PROCEDURE — 3008F BODY MASS INDEX DOCD: CPT | Mod: CPTII,S$GLB,, | Performed by: NURSE PRACTITIONER

## 2022-05-12 PROCEDURE — 51701 PR INSERTION OF NON-INDWELLING BLADDER CATHETERIZATION FOR RESIDUAL UR: ICD-10-PCS | Mod: S$GLB,,, | Performed by: NURSE PRACTITIONER

## 2022-05-12 PROCEDURE — 3077F PR MOST RECENT SYSTOLIC BLOOD PRESSURE >= 140 MM HG: ICD-10-PCS | Mod: CPTII,S$GLB,, | Performed by: NURSE PRACTITIONER

## 2022-05-12 PROCEDURE — 99204 PR OFFICE/OUTPT VISIT, NEW, LEVL IV, 45-59 MIN: ICD-10-PCS | Mod: 25,S$GLB,, | Performed by: NURSE PRACTITIONER

## 2022-05-12 PROCEDURE — 1160F RVW MEDS BY RX/DR IN RCRD: CPT | Mod: CPTII,S$GLB,, | Performed by: NURSE PRACTITIONER

## 2022-05-12 PROCEDURE — 1126F AMNT PAIN NOTED NONE PRSNT: CPT | Mod: CPTII,S$GLB,, | Performed by: NURSE PRACTITIONER

## 2022-05-12 RX ORDER — SOLIFENACIN SUCCINATE 5 MG/1
5 TABLET, FILM COATED ORAL DAILY
Qty: 30 TABLET | Refills: 3 | Status: SHIPPED | OUTPATIENT
Start: 2022-05-12

## 2022-05-12 RX ORDER — GABAPENTIN 100 MG/1
CAPSULE ORAL
COMMUNITY
End: 2022-08-01

## 2022-05-12 NOTE — PATIENT INSTRUCTIONS
Discussed conservative measures to control urgency and frequency including   1. Avoiding/minimizing bladder irritants (see below), especially in afternoon and evening hours    Discussed bladder irritants include coffee (even decaf), tea, alcohol, soda, spicy foods, acidic juices (orange, tomato), vinegar, and artificial sweeteners/sugary beverages.    2. timed voiding - empty on a schedule (approx ~2-3 hours) in spite of need to urinate, to get ahead of urge    3. dont postponing voiding - dont hold it on purpose     4. bowel regimen as distended bowel has extrinsic compressive effect on bladder.   - any or all of the following in any combination, titrate to soft daily bowel movement without pushing or straining  - colace/stool softener capsule - once to twice daily  - miralax - 1 capful daily to start, can increase to 2x daily (or decrease to 1/2 cap daily)  - increase dietary fibery  - fiber supplements, such as metamucil  - prunes, prune juice    5. INCREASE water intake    6. Stop fluids 2 hours before bed, and urinate just before bed       [facility] is website/nitin for discount on medications   Left message to return call

## 2022-05-14 LAB — BACTERIA UR CULT: NO GROWTH

## 2022-06-17 ENCOUNTER — OFFICE VISIT (OUTPATIENT)
Dept: ORTHOPEDICS | Facility: CLINIC | Age: 67
End: 2022-06-17
Payer: MEDICARE

## 2022-06-17 VITALS — WEIGHT: 222 LBS | BODY MASS INDEX: 36.99 KG/M2 | HEIGHT: 65 IN

## 2022-06-17 DIAGNOSIS — M75.42 IMPINGEMENT SYNDROME OF LEFT SHOULDER: Primary | ICD-10-CM

## 2022-06-17 PROCEDURE — 4010F ACE/ARB THERAPY RXD/TAKEN: CPT | Mod: CPTII,S$GLB,, | Performed by: PHYSICIAN ASSISTANT

## 2022-06-17 PROCEDURE — 1159F MED LIST DOCD IN RCRD: CPT | Mod: CPTII,S$GLB,, | Performed by: PHYSICIAN ASSISTANT

## 2022-06-17 PROCEDURE — 1159F PR MEDICATION LIST DOCUMENTED IN MEDICAL RECORD: ICD-10-PCS | Mod: CPTII,S$GLB,, | Performed by: PHYSICIAN ASSISTANT

## 2022-06-17 PROCEDURE — 4010F PR ACE/ARB THEARPY RXD/TAKEN: ICD-10-PCS | Mod: CPTII,S$GLB,, | Performed by: PHYSICIAN ASSISTANT

## 2022-06-17 PROCEDURE — 3008F PR BODY MASS INDEX (BMI) DOCUMENTED: ICD-10-PCS | Mod: CPTII,S$GLB,, | Performed by: PHYSICIAN ASSISTANT

## 2022-06-17 PROCEDURE — 1160F RVW MEDS BY RX/DR IN RCRD: CPT | Mod: CPTII,S$GLB,, | Performed by: PHYSICIAN ASSISTANT

## 2022-06-17 PROCEDURE — 99213 PR OFFICE/OUTPT VISIT, EST, LEVL III, 20-29 MIN: ICD-10-PCS | Mod: S$GLB,,, | Performed by: PHYSICIAN ASSISTANT

## 2022-06-17 PROCEDURE — 1100F PR PT FALLS ASSESS DOC 2+ FALLS/FALL W/INJURY/YR: ICD-10-PCS | Mod: CPTII,S$GLB,, | Performed by: PHYSICIAN ASSISTANT

## 2022-06-17 PROCEDURE — 3288F PR FALLS RISK ASSESSMENT DOCUMENTED: ICD-10-PCS | Mod: CPTII,S$GLB,, | Performed by: PHYSICIAN ASSISTANT

## 2022-06-17 PROCEDURE — 1100F PTFALLS ASSESS-DOCD GE2>/YR: CPT | Mod: CPTII,S$GLB,, | Performed by: PHYSICIAN ASSISTANT

## 2022-06-17 PROCEDURE — 1160F PR REVIEW ALL MEDS BY PRESCRIBER/CLIN PHARMACIST DOCUMENTED: ICD-10-PCS | Mod: CPTII,S$GLB,, | Performed by: PHYSICIAN ASSISTANT

## 2022-06-17 PROCEDURE — 99213 OFFICE O/P EST LOW 20 MIN: CPT | Mod: S$GLB,,, | Performed by: PHYSICIAN ASSISTANT

## 2022-06-17 PROCEDURE — 3008F BODY MASS INDEX DOCD: CPT | Mod: CPTII,S$GLB,, | Performed by: PHYSICIAN ASSISTANT

## 2022-06-17 PROCEDURE — 1126F AMNT PAIN NOTED NONE PRSNT: CPT | Mod: CPTII,S$GLB,, | Performed by: PHYSICIAN ASSISTANT

## 2022-06-17 PROCEDURE — 3288F FALL RISK ASSESSMENT DOCD: CPT | Mod: CPTII,S$GLB,, | Performed by: PHYSICIAN ASSISTANT

## 2022-06-17 PROCEDURE — 1126F PR PAIN SEVERITY QUANTIFIED, NO PAIN PRESENT: ICD-10-PCS | Mod: CPTII,S$GLB,, | Performed by: PHYSICIAN ASSISTANT

## 2022-06-17 NOTE — PROGRESS NOTES
[facility]  [address]  [place] [address]  Phone: [phone]   [phone]    Patient's PCP: Rishi Mosher MD  Referring Provider: No ref. provider found    Subjective:      Chief Complaint:   Chief Complaint   Patient presents with    Left Shoulder - Pain     Pt is here to f/up on Left Shoulder inj & PT status, Injection and PT are both helping.        Past Medical History:   Diagnosis Date    Carpal tunnel syndrome     Diverticulitis        Past Surgical History:   Procedure Laterality Date    ABDOMINAL SURGERY      bladder lift    CARPAL TUNNEL RELEASE      RT CTR    cystomoty      HERNIA MESH REMOVAL N/A     HYSTERECTOMY      KNEE SURGERY      BL TKA    SHOULDER SURGERY      TSA right       Current Outpatient Medications   Medication Sig    albuterol (PROVENTIL/VENTOLIN HFA) 90 mcg/actuation inhaler 1 puff every 4 (four) hours as needed.    clonazePAM (KLONOPIN) 0.5 MG tablet     cycloSPORINE (RESTASIS) 0.05 % ophthalmic emulsion 1 drop into affected eye    gabapentin (NEURONTIN) 100 MG capsule 1 capsule    ibuprofen 200 mg Cap ibuprofen    meclizine (ANTIVERT) 25 mg tablet 1 tablet as needed    omeprazole (PRILOSEC) 10 MG capsule omeprazole    pantoprazole (PROTONIX) 20 MG tablet TAKE 1 TABLET BY MOUTH AS NEEDED FOR 90 DAYS    potassium 99 mg Tab potassium    rosuvastatin (CRESTOR) 10 MG tablet Take 10 mg by mouth every evening.    sertraline (ZOLOFT) 100 MG tablet 1 tablet    solifenacin (VESICARE) 5 MG tablet Take 1 tablet (5 mg total) by mouth once daily.    telmisartan (MICARDIS) 40 MG Tab 1 tablet    traZODone (DESYREL) 100 MG tablet 1 tablet at bedtime    vitamin B complex (B COMPLEX 1 ORAL) B Complex     No current facility-administered medications for this visit.       Review of patient's allergies indicates:   Allergen Reactions    Codeine      Other reaction(s): Unknown    Penicillins      Other reaction(s): Unknown       Family History    Family history unknown: Yes       Social History     Socioeconomic History    Marital status: Single   Tobacco Use    Smoking status: Never Smoker    Smokeless tobacco: Never Used   Substance and Sexual Activity    Alcohol use: Never    Drug use: Never       History of present illness:  Ms. Acosta is here today for follow-up for her left shoulder.  She was last seen and injected her left shoulder subacromial space approximately 6 weeks ago with Dr. Alves.  She has also started formal physical therapy at that time.  She reports her pain is much improved range of motion is increased.  However, she still reports some weakness about the shoulder which physical therapy is working on improving.    Review of Systems:    Constitutional: Negative for chills, fever and weight loss.   HENT: Negative for congestion.    Eyes: Negative for discharge and redness.   Respiratory: Negative for cough and shortness of breath.    Cardiovascular: Negative for chest pain.   Gastrointestinal: Negative for nausea and vomiting.   Musculoskeletal: See HPI.   Skin: Negative for rash.   Neurological: Negative for headaches.   Endo/Heme/Allergies: Does not bruise/bleed easily.   Psychiatric/Behavioral: The patient is not nervous/anxious.    All other systems reviewed and are negative.       Objective:      Physical Examination:    Vital Signs:  There were no vitals filed for this visit.    Body mass index is 36.94 kg/m².    This a well-developed, well nourished patient in no acute distress.  They are alert and oriented and cooperative to examination.     Left shoulder exam:  Skin left shoulder is clean dry and intact.  There is no erythema or ecchymosis.  There are no signs or symptoms of infection.  Patient is neurovascularly intact throughout the left upper extremity.  Patient has full active range of motion of the left shoulder with forward flexion, external rotation, internal rotation, and abduction.  She has a negative Neer's.  She  has a negative Maurice.  Her rotator cuff is grossly intact to resisted testing but is quite weak.  It is not painful.    Pertinent New Results:        XRAY Report / Interpretation:   No new radiographs were taken on today's clinic visit.      Assessment:       1. Impingement syndrome of left shoulder      Plan:     Impingement syndrome of left shoulder        No follow-ups on file.    Considering her symptoms are much improved from where she was at her last visit, no further intervention is warranted.  However, considering she has weakness about the rotator cuff I would recommend that she continue with physical therapy specifically focusing on rotator cuff strengthening exercises.  I would also like her to incorporate that into a home exercise regimen from physical therapy.  I would like her to follow-up in the next 6-8 weeks just to ensure she is improving and strengthening the cuff.        Brian Caro, ELIASS, PA-C    This note was created using [employer] voice recognition software that occasionally misinterprets words or phrases.

## 2022-06-17 NOTE — PROGRESS NOTES
[facility]  [address]  [place] [address]  Phone: [phone]   [phone]    Patient's PCP: Rishi Mosher MD  Referring Provider: No ref. provider found    Subjective:      Chief Complaint:   Chief Complaint   Patient presents with    Left Shoulder - Pain     Pt is here to f/up on Left Shoulder inj & PT status, Injection and PT are both helping.        Past Medical History:   Diagnosis Date    Carpal tunnel syndrome     Diverticulitis        Past Surgical History:   Procedure Laterality Date    ABDOMINAL SURGERY      bladder lift    CARPAL TUNNEL RELEASE      RT CTR    cystomoty      HERNIA MESH REMOVAL N/A     HYSTERECTOMY      KNEE SURGERY      BL TKA    SHOULDER SURGERY      TSA right       Current Outpatient Medications   Medication Sig    albuterol (PROVENTIL/VENTOLIN HFA) 90 mcg/actuation inhaler 1 puff every 4 (four) hours as needed.    clonazePAM (KLONOPIN) 0.5 MG tablet     cycloSPORINE (RESTASIS) 0.05 % ophthalmic emulsion 1 drop into affected eye    gabapentin (NEURONTIN) 100 MG capsule 1 capsule    ibuprofen 200 mg Cap ibuprofen    meclizine (ANTIVERT) 25 mg tablet 1 tablet as needed    omeprazole (PRILOSEC) 10 MG capsule omeprazole    pantoprazole (PROTONIX) 20 MG tablet TAKE 1 TABLET BY MOUTH AS NEEDED FOR 90 DAYS    potassium 99 mg Tab potassium    rosuvastatin (CRESTOR) 10 MG tablet Take 10 mg by mouth every evening.    sertraline (ZOLOFT) 100 MG tablet 1 tablet    solifenacin (VESICARE) 5 MG tablet Take 1 tablet (5 mg total) by mouth once daily.    telmisartan (MICARDIS) 40 MG Tab 1 tablet    traZODone (DESYREL) 100 MG tablet 1 tablet at bedtime    vitamin B complex (B COMPLEX 1 ORAL) B Complex     No current facility-administered medications for this visit.       Review of patient's allergies indicates:   Allergen Reactions    Codeine      Other reaction(s): Unknown    Penicillins      Other reaction(s): Unknown       Family History    Family history unknown: Yes       Social History     Socioeconomic History    Marital status: Single   Tobacco Use    Smoking status: Never Smoker    Smokeless tobacco: Never Used   Substance and Sexual Activity    Alcohol use: Never    Drug use: Never       History of present illness:  Ms. Acosta is here today for follow-up for her left shoulder.  She was last seen and injected her left shoulder subacromial space approximately 6 weeks ago with Dr. Alves.  She has also started formal physical therapy at that time.  She reports her pain is much improved range of motion is increased.  However, she still reports some weakness about the shoulder which physical therapy is working on improving.    Review of Systems:    Constitutional: Negative for chills, fever and weight loss.   HENT: Negative for congestion.    Eyes: Negative for discharge and redness.   Respiratory: Negative for cough and shortness of breath.    Cardiovascular: Negative for chest pain.   Gastrointestinal: Negative for nausea and vomiting.   Musculoskeletal: See HPI.   Skin: Negative for rash.   Neurological: Negative for headaches.   Endo/Heme/Allergies: Does not bruise/bleed easily.   Psychiatric/Behavioral: The patient is not nervous/anxious.    All other systems reviewed and are negative.       Objective:      Physical Examination:    Vital Signs:  There were no vitals filed for this visit.    Body mass index is 36.94 kg/m².    This a well-developed, well nourished patient in no acute distress.  They are alert and oriented and cooperative to examination.     Left shoulder exam:  Skin left shoulder is clean dry and intact.  There is no erythema or ecchymosis.  There are no signs or symptoms of infection.  Patient is neurovascularly intact throughout the left upper extremity.  Patient has full active range of motion of the left shoulder with forward flexion, external rotation, internal rotation, and abduction.  She has a negative Neer's.  She  has a negative Maurice.  Her rotator cuff is grossly intact to resisted testing but is quite weak.  It is not painful.    Pertinent New Results:        XRAY Report / Interpretation:   No new radiographs were taken on today's clinic visit.      Assessment:       No diagnosis found.  Plan:     There are no diagnoses linked to this encounter.    No follow-ups on file.    Considering her symptoms are much improved from where she was at her last visit, no further intervention is warranted.  However, considering she has weakness about the rotator cuff I would recommend that she continue with physical therapy specifically focusing on rotator cuff strengthening exercises.  I would also like her to incorporate that into a home exercise regimen from physical therapy.  I would like her to follow-up in the next 6-8 weeks just to ensure she is improving and strengthening the cuff.        Brian Caro, ELIASS, PA-C    This note was created using [employer] voice recognition software that occasionally misinterprets words or phrases.

## 2022-07-21 ENCOUNTER — TELEPHONE (OUTPATIENT)
Dept: GASTROENTEROLOGY | Facility: CLINIC | Age: 67
End: 2022-07-21
Payer: MEDICAID

## 2022-07-25 ENCOUNTER — TELEPHONE (OUTPATIENT)
Dept: GASTROENTEROLOGY | Facility: CLINIC | Age: 67
End: 2022-07-25
Payer: MEDICAID

## 2022-07-25 NOTE — TELEPHONE ENCOUNTER
----- Message from Norma Wilson sent at 7/25/2022  2:10 PM CDT -----  Contact: [phone]  Type: Needs Medical Advice  Who Called: Pt    Best Call Back Number: [phone]    Additional Information: Pt is calling to schedule her colonoscopy. Pls call back and and advise

## 2022-07-25 NOTE — TELEPHONE ENCOUNTER
Called patient to schedule colonoscopy. At the time patient would like EGD and Colonoscopy and was given clinic appointment on 7/27/2022.

## 2022-07-27 ENCOUNTER — TELEPHONE (OUTPATIENT)
Dept: GASTROENTEROLOGY | Facility: CLINIC | Age: 67
End: 2022-07-27
Payer: MEDICAID

## 2022-07-27 ENCOUNTER — LAB VISIT (OUTPATIENT)
Dept: LAB | Facility: HOSPITAL | Age: 67
End: 2022-07-27
Attending: NURSE PRACTITIONER
Payer: MEDICARE

## 2022-07-27 ENCOUNTER — OFFICE VISIT (OUTPATIENT)
Dept: GASTROENTEROLOGY | Facility: CLINIC | Age: 67
End: 2022-07-27
Payer: MEDICARE

## 2022-07-27 VITALS — BODY MASS INDEX: 38.09 KG/M2 | WEIGHT: 228.63 LBS | HEIGHT: 65 IN

## 2022-07-27 DIAGNOSIS — R19.5 CHANGE IN STOOL CALIBER: ICD-10-CM

## 2022-07-27 DIAGNOSIS — R61 NIGHT SWEATS: ICD-10-CM

## 2022-07-27 DIAGNOSIS — R10.32 LEFT LOWER QUADRANT PAIN: ICD-10-CM

## 2022-07-27 DIAGNOSIS — Z87.19 HISTORY OF DIVERTICULOSIS: ICD-10-CM

## 2022-07-27 DIAGNOSIS — Z87.19 HISTORY OF GASTROESOPHAGEAL REFLUX (GERD): ICD-10-CM

## 2022-07-27 DIAGNOSIS — R10.2 SUPRAPUBIC PAIN: Primary | ICD-10-CM

## 2022-07-27 DIAGNOSIS — K92.1 HEMATOCHEZIA: ICD-10-CM

## 2022-07-27 DIAGNOSIS — R05.9 COUGH: ICD-10-CM

## 2022-07-27 DIAGNOSIS — R10.30 LOWER ABDOMINAL PAIN: ICD-10-CM

## 2022-07-27 PROBLEM — F32.A DEPRESSIVE DISORDER: Status: ACTIVE | Noted: 2017-02-22

## 2022-07-27 PROBLEM — M54.30 SCIATICA: Status: ACTIVE | Noted: 2022-07-27

## 2022-07-27 PROBLEM — F41.9 ANXIETY: Status: ACTIVE | Noted: 2017-02-22

## 2022-07-27 PROBLEM — I10 ESSENTIAL HYPERTENSION: Status: ACTIVE | Noted: 2017-06-09

## 2022-07-27 PROBLEM — K21.9 GASTRIC REFLUX: Status: ACTIVE | Noted: 2017-02-22

## 2022-07-27 PROBLEM — K57.92 DIVERTICULITIS: Status: ACTIVE | Noted: 2017-02-22

## 2022-07-27 PROBLEM — Z86.010 HISTORY OF COLONIC POLYPS: Status: ACTIVE | Noted: 2017-02-22

## 2022-07-27 PROBLEM — Z86.0100 HISTORY OF COLONIC POLYPS: Status: ACTIVE | Noted: 2017-02-22

## 2022-07-27 LAB
CREAT SERPL-MCNC: 0.8 MG/DL (ref 0.5–1.4)
ERYTHROCYTE [DISTWIDTH] IN BLOOD BY AUTOMATED COUNT: 13.9 % (ref 11.5–14.5)
EST. GFR  (AFRICAN AMERICAN): >60 ML/MIN/1.73 M^2
EST. GFR  (NON AFRICAN AMERICAN): >60 ML/MIN/1.73 M^2
HCT VFR BLD AUTO: 39.2 % (ref 37–48.5)
HGB BLD-MCNC: 12.6 G/DL (ref 12–16)
MCH RBC QN AUTO: 27.5 PG (ref 27–31)
MCHC RBC AUTO-ENTMCNC: 32.1 G/DL (ref 32–36)
MCV RBC AUTO: 85 FL (ref 82–98)
PLATELET # BLD AUTO: 265 K/UL (ref 150–450)
PMV BLD AUTO: 10.2 FL (ref 9.2–12.9)
RBC # BLD AUTO: 4.59 M/UL (ref 4–5.4)
WBC # BLD AUTO: 6.39 K/UL (ref 3.9–12.7)

## 2022-07-27 PROCEDURE — 3288F PR FALLS RISK ASSESSMENT DOCUMENTED: ICD-10-PCS | Mod: CPTII,S$GLB,, | Performed by: NURSE PRACTITIONER

## 2022-07-27 PROCEDURE — 1160F PR REVIEW ALL MEDS BY PRESCRIBER/CLIN PHARMACIST DOCUMENTED: ICD-10-PCS | Mod: CPTII,S$GLB,, | Performed by: NURSE PRACTITIONER

## 2022-07-27 PROCEDURE — 1125F AMNT PAIN NOTED PAIN PRSNT: CPT | Mod: CPTII,S$GLB,, | Performed by: NURSE PRACTITIONER

## 2022-07-27 PROCEDURE — 1101F PT FALLS ASSESS-DOCD LE1/YR: CPT | Mod: CPTII,S$GLB,, | Performed by: NURSE PRACTITIONER

## 2022-07-27 PROCEDURE — 99214 OFFICE O/P EST MOD 30 MIN: CPT | Mod: S$GLB,,, | Performed by: NURSE PRACTITIONER

## 2022-07-27 PROCEDURE — 36415 COLL VENOUS BLD VENIPUNCTURE: CPT | Performed by: NURSE PRACTITIONER

## 2022-07-27 PROCEDURE — 1159F PR MEDICATION LIST DOCUMENTED IN MEDICAL RECORD: ICD-10-PCS | Mod: CPTII,S$GLB,, | Performed by: NURSE PRACTITIONER

## 2022-07-27 PROCEDURE — 99214 PR OFFICE/OUTPT VISIT, EST, LEVL IV, 30-39 MIN: ICD-10-PCS | Mod: S$GLB,,, | Performed by: NURSE PRACTITIONER

## 2022-07-27 PROCEDURE — 1160F RVW MEDS BY RX/DR IN RCRD: CPT | Mod: CPTII,S$GLB,, | Performed by: NURSE PRACTITIONER

## 2022-07-27 PROCEDURE — 1125F PR PAIN SEVERITY QUANTIFIED, PAIN PRESENT: ICD-10-PCS | Mod: CPTII,S$GLB,, | Performed by: NURSE PRACTITIONER

## 2022-07-27 PROCEDURE — 85027 COMPLETE CBC AUTOMATED: CPT | Performed by: NURSE PRACTITIONER

## 2022-07-27 PROCEDURE — 99999 PR PBB SHADOW E&M-EST. PATIENT-LVL III: ICD-10-PCS | Mod: PBBFAC,,, | Performed by: NURSE PRACTITIONER

## 2022-07-27 PROCEDURE — 4010F ACE/ARB THERAPY RXD/TAKEN: CPT | Mod: CPTII,S$GLB,, | Performed by: NURSE PRACTITIONER

## 2022-07-27 PROCEDURE — 99999 PR PBB SHADOW E&M-EST. PATIENT-LVL III: CPT | Mod: PBBFAC,,, | Performed by: NURSE PRACTITIONER

## 2022-07-27 PROCEDURE — 3008F BODY MASS INDEX DOCD: CPT | Mod: CPTII,S$GLB,, | Performed by: NURSE PRACTITIONER

## 2022-07-27 PROCEDURE — 3288F FALL RISK ASSESSMENT DOCD: CPT | Mod: CPTII,S$GLB,, | Performed by: NURSE PRACTITIONER

## 2022-07-27 PROCEDURE — 4010F PR ACE/ARB THEARPY RXD/TAKEN: ICD-10-PCS | Mod: CPTII,S$GLB,, | Performed by: NURSE PRACTITIONER

## 2022-07-27 PROCEDURE — 82565 ASSAY OF CREATININE: CPT | Performed by: NURSE PRACTITIONER

## 2022-07-27 PROCEDURE — 3008F PR BODY MASS INDEX (BMI) DOCUMENTED: ICD-10-PCS | Mod: CPTII,S$GLB,, | Performed by: NURSE PRACTITIONER

## 2022-07-27 PROCEDURE — 1159F MED LIST DOCD IN RCRD: CPT | Mod: CPTII,S$GLB,, | Performed by: NURSE PRACTITIONER

## 2022-07-27 PROCEDURE — 1101F PR PT FALLS ASSESS DOC 0-1 FALLS W/OUT INJ PAST YR: ICD-10-PCS | Mod: CPTII,S$GLB,, | Performed by: NURSE PRACTITIONER

## 2022-07-27 RX ORDER — IBUPROFEN 100 MG/5ML
1000 SUSPENSION, ORAL (FINAL DOSE FORM) ORAL
COMMUNITY
Start: 2021-09-15

## 2022-07-27 RX ORDER — IBUPROFEN 200 MG
950 CAPSULE ORAL
COMMUNITY
Start: 2021-09-15

## 2022-07-27 RX ORDER — CHOLECALCIFEROL (VITAMIN D3) 25 MCG
1000 TABLET ORAL
COMMUNITY
Start: 2021-09-15

## 2022-07-27 NOTE — TELEPHONE ENCOUNTER
----- Message from DAISY Muller sent at 7/27/2022 12:16 PM CDT -----  Please call to inform & review the results with the patient- lab results are normal.  Continue with previous recommendations.  Thanks,  Catrina VILLA-C

## 2022-07-27 NOTE — PROGRESS NOTES
Subjective:       Patient ID: Karla Tejeda is a 66 y.o. female Body mass index is 38.04 kg/m².    Chief Complaint: Diverticulitis    This patient is new to me.     Patient is requesting to be scheduled for EGD & colonoscopy.    GI Problem  The primary symptoms include abdominal pain and hematochezia. Primary symptoms do not include fever, weight loss, fatigue, nausea, vomiting, diarrhea, melena or dysuria.   The abdominal pain began more than 2 days ago (started ~ 2 weeks ago). The abdominal pain is located in the suprapubic region (described as sharp, tender). The severity of the abdominal pain is 8/10 (currently). Relieved by: heating pad.   The hematochezia began yesterday. Frequency: small amount of light red blood noted in stool. The hematochezia is a new problem.   The illness does not include chills, dysphagia, odynophagia or constipation. Associated symptoms comments: Change in stool caliber (smaller stool); bowel movements are 3 times a day. Significant associated medical issues include GERD (controlled with taking protonix 20 mg daily; PAST TREATMENT: prilosec), hemorrhoids and diverticulitis (reports history of diverticulitis, reports frequent flare-ups).     Review of Systems   Constitutional: Positive for diaphoresis (night sweats since she changed to cotton sheets). Negative for appetite change, chills, fatigue, fever and weight loss.   HENT: Negative for sore throat and trouble swallowing.    Respiratory: Positive for cough (occasional, nonproductive mostly; triggered with eating and lying down). Negative for choking and shortness of breath.    Cardiovascular: Negative for chest pain.   Gastrointestinal: Positive for abdominal pain, anal bleeding, blood in stool and hematochezia. Negative for constipation, diarrhea, dysphagia, melena, nausea, rectal pain and vomiting.   Genitourinary: Negative for difficulty urinating, dysuria and flank pain.   Neurological: Negative for weakness.       No LMP  "recorded. Patient has had a hysterectomy.  Past Medical History:   Diagnosis Date    Carpal tunnel syndrome     Colon polyp     Diverticulitis     Diverticulosis     Esophageal ulcer     GERD (gastroesophageal reflux disease)     Hiatal hernia      Past Surgical History:   Procedure Laterality Date    ABDOMINAL SURGERY      bladder lift    APPENDECTOMY  ~2000    CARPAL TUNNEL RELEASE      RT CTR    CHOLECYSTECTOMY  ~2000    COLONOSCOPY  05/16/2018    Dr. Ennis, sent for scanning: hemorrhoids, diverticulosis, 4 colon polyps removed    COLONOSCOPY  12/12/2012    Dr. Ennis, sent for scanning: hemorrhoids, diverticulosis, 3 colon polyps removed    COLONOSCOPY  09/13/2021    Dr. Ennis, sent for scanning: hemorrhoids, "a few 2 to 4 mm polyps in the rectum, removed with a jumbo cold forceps", diverticulosis, repeat in 5 years for surveillance    cystomoty      ESOPHAGOGASTRODUODENOSCOPY  05/16/2018    Dr. Ennis, sent for scanning: mild non-erosive gastritis, esophageal ulcer & erosion; mild esophagitis; hiatal hernia    ESOPHAGOGASTRODUODENOSCOPY  10/31/2018    Dr. Ennis, sent for scanning: small hiatal hernia    HERNIA MESH REMOVAL N/A     HYSTERECTOMY      KNEE SURGERY      BL TKA    SHOULDER SURGERY      TSA right     Family History   Problem Relation Age of Onset    Prostate cancer Brother     Colon cancer Neg Hx     Stomach cancer Neg Hx     Esophageal cancer Neg Hx     Crohn's disease Neg Hx     Ulcerative colitis Neg Hx     Celiac disease Neg Hx      Social History     Tobacco Use    Smoking status: Never Smoker    Smokeless tobacco: Never Used   Substance Use Topics    Alcohol use: Never    Drug use: Never     Wt Readings from Last 10 Encounters:   07/27/22 103.7 kg (228 lb 9.9 oz)   06/17/22 100.7 kg (222 lb)   05/12/22 101 kg (222 lb 10.6 oz)   05/05/22 95.3 kg (210 lb)   03/10/22 95.3 kg (210 lb)     Reviewed medical records received from patient, summarized below " and in medical & surgical history (endoscopies, etc), copies made & given to nurse to be scanned into system:   9/13/2021 colonoscopy  10/31/2018 EGD  5/16/2018 colonoscopy & EGD  12/12/2012 colonoscopy    Objective:      Physical Exam  Vitals and nursing note reviewed.   Constitutional:       General: She is not in acute distress.     Appearance: Normal appearance. She is well-developed. She is not diaphoretic.   HENT:      Mouth/Throat:      Comments: Patient is wearing a face mask, which covers patient's mouth and nose, due to COVID 19 concerns.  Eyes:      General: No scleral icterus.     Conjunctiva/sclera: Conjunctivae normal.   Pulmonary:      Effort: Pulmonary effort is normal. No respiratory distress.      Breath sounds: Normal breath sounds. No wheezing.   Abdominal:      General: Bowel sounds are normal. There is no distension or abdominal bruit.      Palpations: Abdomen is soft. Abdomen is not rigid. There is no mass.      Tenderness: There is abdominal tenderness (mild) in the right lower quadrant, suprapubic area and left lower quadrant. There is no guarding or rebound.      Comments: Patient declined rectal exam.   Skin:     General: Skin is warm and dry.      Coloration: Skin is not pale.      Findings: No erythema or rash.      Comments: Non-jaundiced   Neurological:      Mental Status: She is alert and oriented to person, place, and time.   Psychiatric:         Behavior: Behavior normal.         Thought Content: Thought content normal.         Judgment: Judgment normal.         Assessment:       1. Suprapubic pain    2. Lower abdominal pain    3. Left lower quadrant pain    4. History of diverticulosis    5. Hematochezia    6. Change in stool caliber    7. History of gastroesophageal reflux (GERD)    8. Night sweats    9. Cough        Plan:       Suprapubic pain & Lower abdominal pain  -     CT Abdomen Pelvis With Contrast; Future; Expected date: 07/27/2022  -     CBC Without Differential; Future;  Expected date: 07/27/2022  -     Creatinine, Serum; Future; Expected date: 07/27/2022  - schedule Colonoscopy to be done in 4-6 weeks, discussed procedure with the patient, verbalized understanding    History of diverticulosis  -     CT Abdomen Pelvis With Contrast; Future; Expected date: 07/27/2022  -     CBC Without Differential; Future; Expected date: 07/27/2022  -     Creatinine, Serum; Future; Expected date: 07/27/2022  - discussed about possible course of antibiotics pending results of CT scan; patient verbalized understanding  - schedule Colonoscopy to be done in 4-6 weeks, discussed procedure with the patient, verbalized understanding  - discussed the diagnosis of diverticulosis and diverticulitis, advised to continue a low fiber diet for the next 4 weeks and then slowly increase fiber in diet. Advised a low fiber diet is recommended for diverticulitis (for about 4 weeks), but to prevent diverticulitis, high fiber diet is recommended.  -Recommended high fiber diet after episode has completely resolved. Recommended daily exercise, adequate water intake (six 8-oz glasses of water daily), and high fiber diet. OTC fiber supplements are recommended if diet does not reach daily fiber goal (25 grams daily), such as Metamucil, Citrucel, or FiberCon (take as directed, separate from other oral medications by >2 hours).  - instructed patient that if symptoms do not resolve or if worsen prior to colonoscopy to contact us or go to ER, patient verbalized understanding  - discussed with patient that if episodes of diverticulitis recur frequently, may need to consult general surgery    Hematochezia  -     CBC Without Differential; Future; Expected date: 07/27/2022  -     CT Abdomen Pelvis With Contrast; Future; Expected date: 07/27/2022  - schedule Colonoscopy to be done in 4-6 weeks, discussed procedure with the patient, verbalized understanding  - discussed about different etiologies that can cause rectal bleeding, such  as but not limited to diverticulosis, polyps, colon mass, colon inflammation or infection, anal fissure or hemorrhoids.   - You may resume normal activity as long as you feel well.  - Avoid/minimize NSAIDs such as ibuprofen (Advil, Motrin) and naproxen (Aleve and Naprosyn).  - Avoid alcohol.    Change in stool caliber  - schedule Colonoscopy to be done in 4-6 weeks, discussed procedure with the patient, including risks and benefits, patient verbalized understanding    History of gastroesophageal reflux (GERD)  - schedule EGD, discussed procedure with patient, including risks and benefits, patient verbalized understanding  - CONTINUE PROTONIX 20 MG DAILY AS DIRECTED  - avoid/minimize use of NSAIDs- since they can cause GI upset, bleeding and/or ulcers. If NSAID must be taken, recommend take with food.    Night sweats & Cough  Recommend follow-up with Primary Care Provider for continued evaluation and management.    Follow up in about 1 month (around 8/27/2022), or if symptoms worsen or fail to improve.      If no improvement in symptoms or symptoms worsen, call/follow-up at clinic or go to ER.        48 minutes of total time spent on the encounter, which includes face to face time and non-face to face time preparing to see the patient (e.g., review of tests), Obtaining and/or reviewing separately obtained history, Documenting clinical information in the electronic or other health record, Independently interpreting results (not separately reported) and communicating results to the patient/family/caregiver, or Care coordination (not separately reported).

## 2022-07-27 NOTE — PATIENT INSTRUCTIONS
Abdominal Pain    Abdominal pain is pain in the stomach or belly area. Everyone has this pain from time to time. In many cases it goes away on its own. But abdominal pain can sometimes be due to a serious problem, such as appendicitis. So its important to know when to seek help.  Causes of abdominal pain  There are many possible causes of abdominal pain. Common causes in adults include:  Constipation, diarrhea, or gas  Stomach acid flowing back up into the esophagus (acid reflux or heartburn)  Severe acid reflux, called GERD (gastroesophageal reflux disease)  A sore in the lining of the stomach or small intestine (peptic ulcer)  Inflammation of the gallbladder, liver, or pancreas  Gallstones or kidney stones  Appendicitis   Intestinal blockage   An internal organ pushing through a muscle or other tissue (hernia)  Urinary tract infections  In women, menstrual cramps, fibroids, or endometriosis  Inflammation or infection of the intestines  Diagnosing the cause of abdominal pain  Your healthcare provider will do a physical exam help find the cause of your pain. If needed, tests will be ordered. Belly pain has many possible causes. So it can be hard to find the reason for your pain. Giving details about your pain can help. Tell your provider where and when you feel the pain, and what makes it better or worse. Also let your provider know if you have other symptoms such as:  Fever  Tiredness  Upset stomach (nausea)  Vomiting  Changes in bathroom habits  Treating abdominal pain  Some causes of pain need emergency medical treatment right away. These include appendicitis or a bowel blockage. Other problems can be treated with rest, fluids, or medicines. Your healthcare provider can give you specific instructions for treatment or self-care based on what is causing your pain.  If you have vomiting or diarrhea, sip water or other clear fluids. When you are ready to eat solid foods again, start with small amounts of  easy-to-digest, low-fat foods. These include apple sauce, toast, or crackers.   When to seek medical care  Call 911 or go to the hospital right away if you:  Cant pass stool and are vomiting  Are vomiting blood or have bloody diarrhea or black, tarry diarrhea  Have chest, neck, or shoulder pain  Feel like you might pass out  Have pain in your shoulder blades with nausea  Have sudden, severe belly pain  Have new, severe pain unlike any you have felt before  Have a belly that is rigid, hard, and tender to touch  Call your healthcare provider if you have:  Pain for more than 5 days  Bloating for more than 2 days  Diarrhea for more than 5 days  A fever of 100.4°F (38.0°C) or higher, or as directed by your provider  Pain that gets worse  Weight loss for no reason  Continued lack of appetite  Blood in your stool  How to prevent abdominal pain  Here are some tips to help prevent abdominal pain:  Eat smaller amounts of food at one time.  Avoid greasy, fried, or other high-fat foods.  Avoid foods that give you gas.  Exercise regularly.  Drink plenty of fluids.  To help prevent GERD symptoms:  Quit smoking.  Reduce alcohol and certain foods that increase stomach acid.  Avoid aspirin and over-the-counter pain and fever medicines (NSAIDS or nonsteroidal anti-inflammatory drugs), if possible  Lose extra weight.  Finish eating at least 2 hours before you go to bed or lie down.  Raise the head of your bed.  Date Last Reviewed: 7/1/2016  © 2661-0202 [employer]. [address] [place] [address], [place] [address]. All rights reserved. This information is not intended as a substitute for professional medical care. Always follow your healthcare professional's instructions.         GERD (Adult)    The esophagus is a tube that carries food from the mouth to the stomach. A valve at the lower end of the esophagus prevents stomach acid from flowing upward. When this valve doesn't work properly, stomach contents may repeatedly flow  "back up (reflux) into the esophagus. This is called gastroesophageal reflux disease (GERD). GERD can irritate the esophagus. It can cause problems with swallowing or breathing. In severe cases, GERD can cause recurrent pneumonia or other serious problems.  Symptoms of reflux include burning, pressure or sharp pain in the upper abdomen or mid to lower chest. The pain can spread to the neck, back, or shoulder. There may be belching, an acid taste in the back of the throat, chronic cough, or sore throat or hoarseness. GERD symptoms often occur during the day after a big meal. They can also occur at night when lying down.   Home care  Lifestyle changes can help reduce symptoms. If needed, medicines may be prescribed. Symptoms often improve with treatment, but if treatment is stopped, the symptoms often return after a few months. So most persons with GERD will need to continue treatment.  Lifestyle changes  Limit or avoid fatty, fried, and spicy foods, as well as coffee, chocolate, mint, and foods with high acid content such as tomatoes and citrus fruit and juices (orange, grapefruit, lemon).  Dont eat large meals, especially at night. Frequent, smaller meals are best. Do not lie down right after eating. And dont eat anything 3 hours before going to bed.  Avoid drinking alcohol and smoking. As much as possible, stay away from second hand smoke.  If you are overweight, losing weight will reduce symptoms.   Avoid wearing tight clothing around your stomach area.  If your symptoms occur during sleep, use a foam wedge to elevate your upper body (not just your head.) Or, place 4" blocks under the head of your bed.  Medicines  If needed, medicines can help relieve the symptoms of GERD and prevent damage to the esophagus. Discuss a medicine plan with your healthcare provider. This may include one or more of the following medicines:  Antacids to help neutralize the normal acids in your stomach.  Acid blockers (H2 blockers) to " decrease acid production.  Acid inhibitors (PPIs) to decrease acid production in a different way than the blockers. They may work better, but can take a little longer to take effect.  Take an antacid 30-60 minutes after eating and at bedtime, but not at the same time as an acid blocker.  Try not to take medicines such as ibuprofen and aspirin. If you are taking aspirin for your heart or other medical reasons, talk to your healthcare provider about stopping it.  Follow-up care  Follow up with your healthcare provider or as advised by our staff.  When to seek medical advice  Call your healthcare provider if any of the following occur:  Stomach pain gets worse or moves to the lower right abdomen (appendix area)  Chest pain appears or gets worse, or spreads to the back, neck, shoulder, or arm  Frequent vomiting (cant keep down liquids)  Blood in the stool or vomit (red or black in color)  Feeling weak or dizzy  Fever of 100.4ºF (38ºC) or higher, or as directed by your healthcare provider  Date Last Reviewed: 6/23/2015 © 2000-2017 [email or web address]. [address] [place] [address]. All rights reserved. This information is not intended as a substitute for professional medical care. Always follow your healthcare professional's instructions.         Understanding Diverticulosis and Diverticulitis     Pouches or diverticula usually occur in the lower part of the colon called the sigmoid.     The colon (large intestine) is the last part of the digestive tract. It absorbs water from stool and changes it from a liquid to a solid. In certain cases, small pouches called diverticula can form in the colon wall. This condition is called diverticulosis. The pouches can become infected. If this happens, it becomes a more serious problem called diverticulitis. These problems can be painful. But they can be managed.  Managing your condition  Diet changes or medicines may be prescribed.   If you have  diverticulosis  Recommendations include:  Diet changes are often enough to control symptoms. The main changes are adding fiber (roughage) and drinking more water. Fiber absorbs water as it travels through your colon. This helps your stool stay soft and move smoothly. Water helps this process.  If needed, you may be told to take over-the-counter stool softeners.  To help relieve pain, antispasmodic medicines may be prescribed.  Watch for changes in your bowel movements. Tell the healthcare provider if you notice any changes.  Begin an exercise program. Ask your healthcare provider how to get started.  Get plenty of rest and sleep.   If you have diverticulitis  Treatment depends on how bad your symptoms are.  For mild symptoms. You may be put on a liquid diet for a short time. Antibiotics are usually prescribed. If these two steps relieve your symptoms, you may then be prescribed a high-fiber diet. If you still have symptoms, your healthcare provider will discuss more treatment choices with you.  For severe symptoms. You may need to be admitted to the hospital. There, you can be given IV antibiotics and fluids. You will also be put on a low-fiber or liquid diet. Although not common, surgery is needed in some people with severe symptoms.  [place] to colon health     Diverticulitis occurs when the pouches become infected or inflamed.     Help keep your colon healthy with a diet that includes plenty of high-fiber fruits, vegetables, and whole grains. Drink plenty of liquids like water and juice. Maintain a healthy lifestyle including regular exercise, stress management, and adequate rest and sleep.   Date Last Reviewed: 7/1/2016  © 6663-5916 [employer]. [address] [place] [address], [place] [address]. All rights reserved. This information is not intended as a substitute for professional medical care. Always follow your healthcare professional's instructions.

## 2022-07-28 ENCOUNTER — TELEPHONE (OUTPATIENT)
Dept: GASTROENTEROLOGY | Facility: CLINIC | Age: 67
End: 2022-07-28
Payer: MEDICAID

## 2022-07-28 ENCOUNTER — HOSPITAL ENCOUNTER (OUTPATIENT)
Dept: RADIOLOGY | Facility: HOSPITAL | Age: 67
Discharge: HOME | End: 2022-07-28
Attending: NURSE PRACTITIONER
Payer: MEDICARE

## 2022-07-28 DIAGNOSIS — Z87.19 HISTORY OF DIVERTICULOSIS: ICD-10-CM

## 2022-07-28 DIAGNOSIS — R10.32 LEFT LOWER QUADRANT PAIN: ICD-10-CM

## 2022-07-28 DIAGNOSIS — R10.2 SUPRAPUBIC PAIN: ICD-10-CM

## 2022-07-28 DIAGNOSIS — K92.1 HEMATOCHEZIA: ICD-10-CM

## 2022-07-28 DIAGNOSIS — R10.30 LOWER ABDOMINAL PAIN: ICD-10-CM

## 2022-07-28 PROCEDURE — 25500020 PHARM REV CODE 255: Performed by: NURSE PRACTITIONER

## 2022-07-28 PROCEDURE — 74177 CT ABD & PELVIS W/CONTRAST: CPT | Mod: TC

## 2022-07-28 RX ADMIN — IOHEXOL 100 ML: 350 INJECTION, SOLUTION INTRAVENOUS at 10:07

## 2022-07-28 NOTE — TELEPHONE ENCOUNTER
"Please call to inform & review the results with the patient- radiology report of the CT abdomen pelvis showed "No acute findings to help explain the patient's abdominal pain."  Fatty liver noted. For fatty liver recommend: low fat, low cholesterol diet, maintain good control of blood sugars and cholesterol levels, exercise, weight loss (if overweight), minimize/avoid alcohol and tylenol products, & follow-up with PCP for continued evaluation and management; if specialist is needed, recommend seeing hepatology.    Diverticulosis coli without evidence of acute diverticulitis. No infection or inflammation. Recommend high fiber diet (20-30 grams of fiber daily)/OTC fiber supplements daily as directed, such as Benefiber or Metamucil.    "Small fat-containing umbilical and left inguinal hernias, without complicating features": if they become painful or if they do not reduce patient needs to see a general surgeon or go to the ER, otherwise, they can be monitored.  Otherwise, unremarkable findings of the GI tract/GI organs.    Continue with previous recommendations. If no improvement in symptoms or symptoms worsen, call/follow-up at clinic or go to ER.    Thanks,        "

## 2022-08-01 ENCOUNTER — HOSPITAL ENCOUNTER (OUTPATIENT)
Dept: RADIOLOGY | Facility: CLINIC | Age: 67
Discharge: HOME | End: 2022-08-01
Attending: PODIATRIST
Payer: MEDICARE

## 2022-08-01 ENCOUNTER — OFFICE VISIT (OUTPATIENT)
Dept: PODIATRY | Facility: CLINIC | Age: 67
End: 2022-08-01
Payer: MEDICARE

## 2022-08-01 VITALS
WEIGHT: 225 LBS | RESPIRATION RATE: 16 BRPM | HEIGHT: 65 IN | OXYGEN SATURATION: 97 % | HEART RATE: 62 BPM | BODY MASS INDEX: 37.49 KG/M2

## 2022-08-01 DIAGNOSIS — M79.671 PAIN IN BOTH FEET: ICD-10-CM

## 2022-08-01 DIAGNOSIS — M79.672 PAIN IN BOTH FEET: ICD-10-CM

## 2022-08-01 DIAGNOSIS — M72.2 PLANTAR FASCIITIS: Primary | ICD-10-CM

## 2022-08-01 PROCEDURE — 1125F PR PAIN SEVERITY QUANTIFIED, PAIN PRESENT: ICD-10-PCS | Mod: CPTII,S$GLB,, | Performed by: PODIATRIST

## 2022-08-01 PROCEDURE — 3008F BODY MASS INDEX DOCD: CPT | Mod: CPTII,S$GLB,, | Performed by: PODIATRIST

## 2022-08-01 PROCEDURE — 1125F AMNT PAIN NOTED PAIN PRSNT: CPT | Mod: CPTII,S$GLB,, | Performed by: PODIATRIST

## 2022-08-01 PROCEDURE — 99203 OFFICE O/P NEW LOW 30 MIN: CPT | Mod: S$GLB,,, | Performed by: PODIATRIST

## 2022-08-01 PROCEDURE — 99203 PR OFFICE/OUTPT VISIT, NEW, LEVL III, 30-44 MIN: ICD-10-PCS | Mod: S$GLB,,, | Performed by: PODIATRIST

## 2022-08-01 PROCEDURE — 1159F PR MEDICATION LIST DOCUMENTED IN MEDICAL RECORD: ICD-10-PCS | Mod: CPTII,S$GLB,, | Performed by: PODIATRIST

## 2022-08-01 PROCEDURE — 1160F RVW MEDS BY RX/DR IN RCRD: CPT | Mod: CPTII,S$GLB,, | Performed by: PODIATRIST

## 2022-08-01 PROCEDURE — 1100F PR PT FALLS ASSESS DOC 2+ FALLS/FALL W/INJURY/YR: ICD-10-PCS | Mod: CPTII,S$GLB,, | Performed by: PODIATRIST

## 2022-08-01 PROCEDURE — 3288F FALL RISK ASSESSMENT DOCD: CPT | Mod: CPTII,S$GLB,, | Performed by: PODIATRIST

## 2022-08-01 PROCEDURE — 73630 X-RAY EXAM OF FOOT: CPT | Mod: 50,S$GLB,, | Performed by: RADIOLOGY

## 2022-08-01 PROCEDURE — 1100F PTFALLS ASSESS-DOCD GE2>/YR: CPT | Mod: CPTII,S$GLB,, | Performed by: PODIATRIST

## 2022-08-01 PROCEDURE — 3288F PR FALLS RISK ASSESSMENT DOCUMENTED: ICD-10-PCS | Mod: CPTII,S$GLB,, | Performed by: PODIATRIST

## 2022-08-01 PROCEDURE — 1159F MED LIST DOCD IN RCRD: CPT | Mod: CPTII,S$GLB,, | Performed by: PODIATRIST

## 2022-08-01 PROCEDURE — 1160F PR REVIEW ALL MEDS BY PRESCRIBER/CLIN PHARMACIST DOCUMENTED: ICD-10-PCS | Mod: CPTII,S$GLB,, | Performed by: PODIATRIST

## 2022-08-01 PROCEDURE — 73630 XR FOOT COMPLETE 3 VIEW BILATERAL: ICD-10-PCS | Mod: 50,S$GLB,, | Performed by: RADIOLOGY

## 2022-08-01 PROCEDURE — 4010F PR ACE/ARB THEARPY RXD/TAKEN: ICD-10-PCS | Mod: CPTII,S$GLB,, | Performed by: PODIATRIST

## 2022-08-01 PROCEDURE — 4010F ACE/ARB THERAPY RXD/TAKEN: CPT | Mod: CPTII,S$GLB,, | Performed by: PODIATRIST

## 2022-08-01 PROCEDURE — 3008F PR BODY MASS INDEX (BMI) DOCUMENTED: ICD-10-PCS | Mod: CPTII,S$GLB,, | Performed by: PODIATRIST

## 2022-08-01 RX ORDER — SERTRALINE HYDROCHLORIDE 25 MG/1
TABLET, FILM COATED ORAL
COMMUNITY
Start: 2022-07-17

## 2022-08-01 RX ORDER — GABAPENTIN 300 MG/1
CAPSULE ORAL
COMMUNITY
Start: 2022-05-24

## 2022-08-01 NOTE — PROGRESS NOTES
"  [address] [facility] [address]  [place] [address]  Phone: [phone]   [phone]    Patient's PCP:Rishi Mosher MD  Referring Provider: Aaareferral Self    Subjective:      Chief Complaint:: Foot Pain (Bilateral foot pain)    HPI  Karla Tejeda is a 66 y.o. female who presents today with a complaint of bilateral foot pain primarily in heels lasting for two to three months. Onset of symptoms aching to sharp and tingling pain and reports no trauma.  Current symptoms include Pain ranging from aching to sharp throbbing or tingling.  Aggravating factors are walking, weightbearing, increased pain upon rising and first steps in the moring. Symptoms have progressed over time. Treatment to date have included aleve, CBD oil. Patient states she take gabapentin for sciatica.     Systemic Doctor: Rishi Mosher MD  Date Last Seen: 07/27/2022    Vitals:    08/01/22 1052   Pulse: 62   Resp: 16   SpO2: 97%   Weight: 102.1 kg (225 lb)   Height: 5' 5" (1.651 m)   PainSc:   9      Shoe Size: 8.5-9    Past Surgical History:   Procedure Laterality Date    ABDOMINAL SURGERY      bladder lift    APPENDECTOMY  ~2000    CARPAL TUNNEL RELEASE      RT CTR    CHOLECYSTECTOMY  ~2000    COLONOSCOPY  05/16/2018    Dr. Ennis, sent for scanning: hemorrhoids, diverticulosis, 4 colon polyps removed    COLONOSCOPY  12/12/2012    Dr. Ennis, sent for scanning: hemorrhoids, diverticulosis, 3 colon polyps removed    COLONOSCOPY  09/13/2021    Dr. Ennis, sent for scanning: hemorrhoids, "a few 2 to 4 mm polyps in the rectum, removed with a jumbo cold forceps", diverticulosis, repeat in 5 years for surveillance    cystomoty      ESOPHAGOGASTRODUODENOSCOPY  05/16/2018    Dr. Ennis, sent for scanning: mild non-erosive gastritis, esophageal ulcer & erosion; mild esophagitis; hiatal hernia    ESOPHAGOGASTRODUODENOSCOPY  10/31/2018    Dr. Ennis, sent for scanning: small hiatal hernia    HERNIA MESH REMOVAL N/A     " HYSTERECTOMY      KNEE SURGERY      BL TKA    SHOULDER SURGERY      TSA right     Past Medical History:   Diagnosis Date    Carpal tunnel syndrome     Colon polyp     Diverticulitis     Diverticulosis     Esophageal ulcer     GERD (gastroesophageal reflux disease)     Hiatal hernia      Family History   Problem Relation Age of Onset    Prostate cancer Brother     Colon cancer Neg Hx     Stomach cancer Neg Hx     Esophageal cancer Neg Hx     Crohn's disease Neg Hx     Ulcerative colitis Neg Hx     Celiac disease Neg Hx         Social History:   Marital Status: Single  Alcohol History:  reports no history of alcohol use.  Tobacco History:  reports that she has never smoked. She has never used smokeless tobacco.  Drug History:  reports no history of drug use.    Review of patient's allergies indicates:   Allergen Reactions    Penicillins      Other reaction(s): Unknown  Other reaction(s): Angioedema    Codeine Hives     Other reaction(s): Unknown       Current Outpatient Medications   Medication Sig Dispense Refill    albuterol (PROVENTIL/VENTOLIN HFA) 90 mcg/actuation inhaler 1 puff every 4 (four) hours as needed.      ascorbic acid, vitamin C, (VITAMIN C) 1000 MG tablet Take 1,000 mg by mouth.      calcium citrate (CALCITRATE) 200 mg (950 mg) tablet Take 950 mg by mouth.      clonazePAM (KLONOPIN) 0.5 MG tablet       cycloSPORINE (RESTASIS) 0.05 % ophthalmic emulsion 1 drop into affected eye      gabapentin (NEURONTIN) 300 MG capsule       ibuprofen 200 mg Cap ibuprofen      meclizine (ANTIVERT) 25 mg tablet 1 tablet as needed      pantoprazole (PROTONIX) 20 MG tablet Take 20 mg by mouth once daily.      potassium 99 mg Tab potassium      rosuvastatin (CRESTOR) 10 MG tablet Take 10 mg by mouth every evening.      sertraline (ZOLOFT) 25 MG tablet       solifenacin (VESICARE) 5 MG tablet Take 1 tablet (5 mg total) by mouth once daily. (Patient not taking: Reported on 7/27/2022) 30 tablet  3    telmisartan (MICARDIS) 40 MG Tab 1 tablet      traZODone (DESYREL) 100 MG tablet 1 tablet at bedtime      vitamin B complex (B COMPLEX 1 ORAL) B Complex      vitamin D (VITAMIN D3) 1000 units Tab Take 1,000 Units by mouth.       No current facility-administered medications for this visit.       Review of Systems   Constitutional: Negative for chills, fatigue, fever and unexpected weight change.   HENT: Negative for hearing loss and trouble swallowing.    Eyes: Negative for photophobia and visual disturbance.   Respiratory: Negative for cough, shortness of breath and wheezing.    Cardiovascular: Negative for chest pain, palpitations and leg swelling.   Gastrointestinal: Negative for abdominal pain and nausea.   Genitourinary: Negative for dysuria and frequency.   Musculoskeletal: Positive for back pain and myalgias. Negative for arthralgias and joint swelling.   Skin: Negative for rash and wound.   Neurological: Negative for tremors, seizures, weakness, numbness and headaches.   Hematological: Does not bruise/bleed easily.         Objective:        Physical Exam:   Foot Exam    General  General Appearance: appears stated age and healthy   Orientation: alert and oriented to person, place, and time   Affect: appropriate   Gait: unimpaired       Right Foot/Ankle     Inspection and Palpation  Ecchymosis: none  Tenderness: plantar fascia   Swelling: none   Arch: pes planus  Skin Exam: skin intact; no drainage, no ulcer and no erythema   Neurovascular  Dorsalis pedis: 2+  Posterior tibial: 2+  Capillary Refill: 2+  Varicose veins: not present  Saphenous nerve sensation: normal  Tibial nerve sensation: normal  Superficial peroneal nerve sensation: normal  Deep peroneal nerve sensation: normal  Sural nerve sensation: normal    Edema  Type of edema: non-pitting    Muscle Strength  Ankle dorsiflexion: 5  Ankle plantar flexion: 5  Ankle inversion: 5  Ankle eversion: 5  Great toe extension: 5  Great toe flexion:  5    Range of Motion    Normal right ankle ROM    Tests  Anterior drawer: negative   Talar tilt: negative   PT Tinel's sign: negative    Paresthesia: negative  Comments  Pain on palpation of the infeior medial heel and central plantar heel. No pain present  with side to side compression of the calcaneus. Negative tinnel's sign  at the tarsal tunnel. Negative Alejandro's nerve pain. Negative Calcaneal nerve pain. No soft tissue masses. Pain absent  with dorsiflexion of the ankle. No edema, erythema, or ecchymosis noted.     Left Foot/Ankle      Inspection and Palpation  Ecchymosis: none  Tenderness: plantar fascia   Swelling: none   Arch: pes planus  Skin Exam: skin intact; no drainage, no ulcer and no erythema   Neurovascular  Dorsalis pedis: 2+  Posterior tibial: 2+  Capillary refill: 2+  Varicose veins: not present  Saphenous nerve sensation: normal  Tibial nerve sensation: normal  Superficial peroneal nerve sensation: normal  Deep peroneal nerve sensation: normal  Sural nerve sensation: normal    Edema  Type of edema: non-pitting    Muscle Strength  Ankle dorsiflexion: 5  Ankle plantar flexion: 5  Ankle inversion: 5  Ankle eversion: 5  Great toe extension: 5  Great toe flexion: 5    Range of Motion    Normal left ankle ROM    Tests  Anterior drawer: negative   Talar tilt: negative   PT Tinel's sign: negative  Paresthesia: negative  Comments  Pain on palpation of the infeior medial heel and central plantar heel. No pain present  with side to side compression of the calcaneus. Negative tinnel's sign  at the tarsal tunnel. Negative Alejandro's nerve pain. Negative Calcaneal nerve pain. No soft tissue masses. Pain absent  with dorsiflexion of the ankle. No edema, erythema, or ecchymosis noted.       Physical Exam  Cardiovascular:      Pulses:           Dorsalis pedis pulses are 2+ on the right side and 2+ on the left side.        Posterior tibial pulses are 2+ on the right side and 2+ on the left side.   Feet:      Right  foot:      Skin integrity: No ulcer or erythema.      Left foot:      Skin integrity: No ulcer or erythema.               Right Ankle/Foot Exam     Range of Motion   The patient has normal right ankle ROM.    Comments:  Pain on palpation of the infeior medial heel and central plantar heel. No pain present  with side to side compression of the calcaneus. Negative tinnel's sign  at the tarsal tunnel. Negative Alejandro's nerve pain. Negative Calcaneal nerve pain. No soft tissue masses. Pain absent  with dorsiflexion of the ankle. No edema, erythema, or ecchymosis noted.     Left Ankle/Foot Exam     Range of Motion   The patient has normal left ankle ROM.     Comments:  Pain on palpation of the infeior medial heel and central plantar heel. No pain present  with side to side compression of the calcaneus. Negative tinnel's sign  at the tarsal tunnel. Negative Alejandro's nerve pain. Negative Calcaneal nerve pain. No soft tissue masses. Pain absent  with dorsiflexion of the ankle. No edema, erythema, or ecchymosis noted.         Muscle Strength   Right Lower Extremity   Ankle Dorsiflexion:  5   Plantar flexion:  5/5  Left Lower Extremity   Ankle Dorsiflexion:  5   Plantar flexion:  5/5     Vascular Exam     Right Pulses  Dorsalis Pedis:      2+  Posterior Tibial:      2+        Left Pulses  Dorsalis Pedis:      2+  Posterior Tibial:      2+             Imaging: X-Ray Foot Complete Bilateral  EXAMINATION:  XR FOOT COMPLETE 3 VIEW BILATERAL    CLINICAL HISTORY:  Pain in right foot    TECHNIQUE:  AP, lateral, and oblique views of both feet were performed.    COMPARISON:  None    FINDINGS:  Right: No acute fracture.  Dorsal and plantar calcaneal spurs.  Pes planus.  Mild degenerative change of the 1st MTP joint and hallux sesamoid joint and scattered toe IP joints.    Left: No acute fracture.  Dorsal and plantar calcaneal spurs.  Pes planus.  Minimal degenerative change of the tibiotalar joint with anterior marginal spurs for.   Mild degenerative change of the hallux sesamoid joint and scattered toe IP joints.    Electronically signed by: Davin Vanessa  Date:    08/01/2022  Time:    15:06               Assessment:       1. Plantar fasciitis    2. Pain in both feet      Plan:   Plantar fasciitis    Pain in both feet  -     X-Ray Foot Complete Bilateral      Follow up if symptoms worsen or fail to improve.    Procedures        Plantar Fasciitis Level I Treatment:   Anti-inflammatory  No bare feet  Over the counter insert  Restrict activity level   Use running shoes at full time  No impact exercise and stretch gastrocnemius muscle      Counseling:     I provided patient education verbally regarding:   Patient diagnosis, treatment options, as well as alternatives, risks, and benefits.     Discussed different treatment options for heel pain. I gave written and verbal instructions on heel cord stretching and this was demonstrated for the patient. Patient expressed understanding. Discussed wearing appropriate shoe gear and avoiding flats, slippers, sandals, barefoot, and sockfeet. Recommended arch supports. My recommendation for OTC supports is Spenco polysorb replacement insoles or patient may elect more aggressive treatment with prescription arch supports. We also discussed cortisone injections and NSAID therapy.       This note was created using Dragon voice recognition software that occasionally misinterpreted phrases or words.

## 2022-08-01 NOTE — PATIENT INSTRUCTIONS

## 2022-08-04 ENCOUNTER — TELEPHONE (OUTPATIENT)
Dept: GASTROENTEROLOGY | Facility: CLINIC | Age: 67
End: 2022-08-04
Payer: MEDICAID

## 2022-08-04 NOTE — TELEPHONE ENCOUNTER
----- Message from Thaddeus Henry sent at 8/4/2022  2:44 PM CDT -----  Type: Needs Medical Advice  Who Called:  Patient    Best Call Back Number: [phone]  Additional Information: Patient states that she would like a callback regarding rescheduling her procedure.

## 2022-08-04 NOTE — TELEPHONE ENCOUNTER
Spoke with patient regarding here procedure. Patient would like to cancel procedure at this time and will contact clinic to reschedule when she has a ride.

## 2022-08-09 ENCOUNTER — TELEPHONE (OUTPATIENT)
Dept: GASTROENTEROLOGY | Facility: CLINIC | Age: 67
End: 2022-08-09
Payer: MEDICAID

## 2022-08-09 DIAGNOSIS — K57.92 DIVERTICULITIS: ICD-10-CM

## 2022-08-09 DIAGNOSIS — Z87.19 HISTORY OF DIVERTICULOSIS: Primary | ICD-10-CM

## 2022-08-09 DIAGNOSIS — Z87.19 HISTORY OF GASTROESOPHAGEAL REFLUX (GERD): ICD-10-CM

## 2022-08-09 DIAGNOSIS — K21.9 GASTRIC REFLUX: ICD-10-CM

## 2022-08-09 NOTE — TELEPHONE ENCOUNTER
Reached out to patient to follow up. Patient recently cancelled her procedure for tomorrow. Patient calling to reschedule. New date set for 8/23 at 8:00am.

## 2022-08-09 NOTE — TELEPHONE ENCOUNTER
----- Message from India Urbina MA sent at 8/9/2022  8:27 AM CDT -----  Contact: KT UREÑA [[id number]]  Type: Needs Medical Advice    Who Called:KT UREÑA [[id number]]  Best Call Back Number: [phone]  Inquiry/Question: Please call KT UREÑA [[id number]] regarding upcoming procedure      Thank you~

## 2022-08-23 ENCOUNTER — HOSPITAL ENCOUNTER (OUTPATIENT)
Facility: HOSPITAL | Age: 67
Discharge: HOME | End: 2022-08-23
Attending: INTERNAL MEDICINE | Admitting: INTERNAL MEDICINE
Payer: MEDICARE

## 2022-08-23 ENCOUNTER — ANESTHESIA EVENT (OUTPATIENT)
Dept: ENDOSCOPY | Facility: HOSPITAL | Age: 67
End: 2022-08-23
Payer: MEDICARE

## 2022-08-23 ENCOUNTER — ANESTHESIA (OUTPATIENT)
Dept: ENDOSCOPY | Facility: HOSPITAL | Age: 67
End: 2022-08-23
Payer: MEDICARE

## 2022-08-23 DIAGNOSIS — K21.9 GERD (GASTROESOPHAGEAL REFLUX DISEASE): ICD-10-CM

## 2022-08-23 DIAGNOSIS — K29.70 GASTRITIS, PRESENCE OF BLEEDING UNSPECIFIED, UNSPECIFIED CHRONICITY, UNSPECIFIED GASTRITIS TYPE: Primary | ICD-10-CM

## 2022-08-23 PROCEDURE — 27201012 HC FORCEPS, HOT/COLD, DISP: Performed by: INTERNAL MEDICINE

## 2022-08-23 PROCEDURE — D9220A PRA ANESTHESIA: Mod: CRNA,,, | Performed by: NURSE ANESTHETIST, CERTIFIED REGISTERED

## 2022-08-23 PROCEDURE — 43251 EGD REMOVE LESION SNARE: CPT | Performed by: INTERNAL MEDICINE

## 2022-08-23 PROCEDURE — D9220A PRA ANESTHESIA: Mod: ANES,,, | Performed by: ANESTHESIOLOGY

## 2022-08-23 PROCEDURE — 43251 EGD REMOVE LESION SNARE: CPT | Mod: ,,, | Performed by: INTERNAL MEDICINE

## 2022-08-23 PROCEDURE — 27201089 HC SNARE, DISP (ANY): Performed by: INTERNAL MEDICINE

## 2022-08-23 PROCEDURE — 37000009 HC ANESTHESIA EA ADD 15 MINS: Performed by: INTERNAL MEDICINE

## 2022-08-23 PROCEDURE — 43239 EGD BIOPSY SINGLE/MULTIPLE: CPT | Mod: 59 | Performed by: INTERNAL MEDICINE

## 2022-08-23 PROCEDURE — 43239 EGD BIOPSY SINGLE/MULTIPLE: CPT | Mod: 59,,, | Performed by: INTERNAL MEDICINE

## 2022-08-23 PROCEDURE — 43239 PR EGD, FLEX, W/BIOPSY, SGL/MULTI: ICD-10-PCS | Mod: 59,,, | Performed by: INTERNAL MEDICINE

## 2022-08-23 PROCEDURE — 88305 TISSUE EXAM BY PATHOLOGIST: CPT | Performed by: PATHOLOGY

## 2022-08-23 PROCEDURE — 43251 PR EGD, FLEX, W/REMOVAL, TUMOR/POLYP/LESION(S), SNARE: ICD-10-PCS | Mod: ,,, | Performed by: INTERNAL MEDICINE

## 2022-08-23 PROCEDURE — 88342 CHG IMMUNOCYTOCHEMISTRY: ICD-10-PCS | Mod: 26,,, | Performed by: PATHOLOGY

## 2022-08-23 PROCEDURE — 88342 IMHCHEM/IMCYTCHM 1ST ANTB: CPT | Performed by: PATHOLOGY

## 2022-08-23 PROCEDURE — 88305 TISSUE EXAM BY PATHOLOGIST: CPT | Mod: 26,,, | Performed by: PATHOLOGY

## 2022-08-23 PROCEDURE — 88342 IMHCHEM/IMCYTCHM 1ST ANTB: CPT | Mod: 26,,, | Performed by: PATHOLOGY

## 2022-08-23 PROCEDURE — D9220A PRA ANESTHESIA: ICD-10-PCS | Mod: CRNA,,, | Performed by: NURSE ANESTHETIST, CERTIFIED REGISTERED

## 2022-08-23 PROCEDURE — 37000008 HC ANESTHESIA 1ST 15 MINUTES: Performed by: INTERNAL MEDICINE

## 2022-08-23 PROCEDURE — 25000003 PHARM REV CODE 250: Performed by: NURSE ANESTHETIST, CERTIFIED REGISTERED

## 2022-08-23 PROCEDURE — 88305 TISSUE EXAM BY PATHOLOGIST: ICD-10-PCS | Mod: 26,,, | Performed by: PATHOLOGY

## 2022-08-23 PROCEDURE — 63600175 PHARM REV CODE 636 W HCPCS: Performed by: NURSE ANESTHETIST, CERTIFIED REGISTERED

## 2022-08-23 PROCEDURE — D9220A PRA ANESTHESIA: ICD-10-PCS | Mod: ANES,,, | Performed by: ANESTHESIOLOGY

## 2022-08-23 RX ORDER — SODIUM CHLORIDE 9 MG/ML
INJECTION, SOLUTION INTRAVENOUS CONTINUOUS
Status: DISCONTINUED | OUTPATIENT
Start: 2022-08-23 | End: 2022-08-23 | Stop reason: HOSPADM

## 2022-08-23 RX ORDER — LIDOCAINE HCL/PF 100 MG/5ML
SYRINGE (ML) INTRAVENOUS
Status: DISCONTINUED | OUTPATIENT
Start: 2022-08-23 | End: 2022-08-23

## 2022-08-23 RX ORDER — PANTOPRAZOLE SODIUM 40 MG/1
40 TABLET, DELAYED RELEASE ORAL DAILY
Qty: 90 TABLET | Refills: 3 | Status: SHIPPED | OUTPATIENT
Start: 2022-08-23 | End: 2023-08-23

## 2022-08-23 RX ORDER — PROPOFOL 10 MG/ML
VIAL (ML) INTRAVENOUS
Status: DISCONTINUED | OUTPATIENT
Start: 2022-08-23 | End: 2022-08-23

## 2022-08-23 RX ADMIN — LIDOCAINE HYDROCHLORIDE 100 MG: 20 INJECTION INTRAVENOUS at 08:08

## 2022-08-23 RX ADMIN — PROPOFOL 20 MG: 10 INJECTION, EMULSION INTRAVENOUS at 08:08

## 2022-08-23 RX ADMIN — PROPOFOL 100 MG: 10 INJECTION, EMULSION INTRAVENOUS at 08:08

## 2022-08-23 NOTE — PROVATION PATIENT INSTRUCTIONS
Discharge Summary/Instructions after an Endoscopic Procedure  Patient Name: Karla Tejeda  Patient MRN: [id number]  Patient YOB: 1955 Tuesday, August 23, 2022  Migel Dobson MD  Dear patient,  As a result of recent federal legislation (The Federal Cures Act), you may   receive lab or pathology results from your procedure in your [facility]   account before your physician is able to contact you. Your physician or   their representative will relay the results to you with their   recommendations at their soonest availability.  Thank you,  RESTRICTIONS:  During your procedure today, you received medications for sedation.  These   medications may affect your judgment, balance and coordination.  Therefore,   for 24 hours, you have the following restrictions:   - DO NOT drive a car, operate machinery, make legal/financial decisions,   sign important papers or drink alcohol.    ACTIVITY:  Today: no heavy lifting, straining or running due to procedural   sedation/anesthesia.  The following day: return to full activity including work.  DIET:  Eat and drink normally unless instructed otherwise.     TREATMENT FOR COMMON SIDE EFFECTS:  - Mild abdominal pain, nausea, belching, bloating or excessive gas:  rest,   eat lightly and use a heating pad.  - Sore Throat: treat with throat lozenges and/or gargle with warm salt   water.  - Because air was used during the procedure, expelling large amounts of air   from your rectum or belching is normal.  - If a bowel prep was taken, you may not have a bowel movement for 1-3 days.    This is normal.  SYMPTOMS TO WATCH FOR AND REPORT TO YOUR PHYSICIAN:  1. Abdominal pain or bloating, other than gas cramps.  2. Chest pain.  3. Back pain.  4. Signs of infection such as: chills or fever occurring within 24 hours   after the procedure.  5. Rectal bleeding, which would show as bright red, maroon, or black stools.   (A tablespoon of blood from the rectum is not serious, especially if    hemorrhoids are present.)  6. Vomiting.  7. Weakness or dizziness.  GO DIRECTLY TO THE NEAREST EMERGENCY ROOM IF YOU HAVE ANY OF THE FOLLOWING:      Difficulty breathing              Chills and/or fever over 101 F   Persistent vomiting and/or vomiting blood   Severe abdominal pain   Severe chest pain   Black, tarry stools   Bleeding- more than one tablespoon   Any other symptom or condition that you feel may need urgent attention  Your doctor recommends these additional instructions:  If any biopsies were taken, your doctors clinic will contact you in 1 to 2   weeks with any results.  - Patient has a contact number available for emergencies.  The signs and   symptoms of potential delayed complications were discussed with the   patient.  Return to normal activities tomorrow.  Written discharge   instructions were provided to the patient.   - Resume previous diet.   - Continue present medications.   - No aspirin, ibuprofen, naproxen, or other non-steroidal anti-inflammatory   drugs.   - Await pathology results.   - Discharge patient to home (ambulatory).   - Follow an antireflux regimen.   - Use Protonix (pantoprazole) 40 mg PO daily.   - Return to GI office after studies are complete.  For questions, problems or results please call your physician - Migel Dobson MD at Work:  [phone].  [facility] [place], EMERGENCY ROOM PHONE NUMBER: [phone]  IF A COMPLICATION OR EMERGENCY SITUATION ARISES AND YOU ARE UNABLE TO REACH   YOUR PHYSICIAN - GO DIRECTLY TO THE EMERGENCY ROOM.  Migel Dobson MD  8/23/2022 8:24:49 AM  This report has been verified and signed electronically.  Dear patient,  As a result of recent federal legislation (The Federal Cures Act), you may   receive lab or pathology results from your procedure in your [facility]   account before your physician is able to contact you. Your physician or   their representative will relay the results to you with their   recommendations at their soonest  availability.  Thank you,  PROVATION

## 2022-08-23 NOTE — TRANSFER OF CARE
Anesthesia Transfer of Care Note    Patient: Karla Tejeda    Procedure(s) Performed: Procedure(s) (LRB):  EGD (ESOPHAGOGASTRODUODENOSCOPY) (N/A)    Patient location: PACU    Anesthesia Type: general    Transport from OR: Transported from OR on room air with adequate spontaneous ventilation    Post pain: adequate analgesia    Post assessment: no apparent anesthetic complications    Post vital signs: stable    Level of consciousness: awake    Nausea/Vomiting: no nausea/vomiting    Complications: none    Transfer of care protocol was followed      Last vitals:   Visit Vitals  BP (!) 168/84 (BP Location: Left arm, Patient Position: Lying)   Pulse (!) 53   Temp 36.5 °C (97.7 °F) (Skin)   Resp 12   SpO2 98%   Breastfeeding No

## 2022-08-23 NOTE — ANESTHESIA PREPROCEDURE EVALUATION
08/23/2022  Karla Tejeda is a 67 y.o., female.      Pre-op Assessment    I have reviewed the Patient Summary Reports.     I have reviewed the Nursing Notes. I have reviewed the NPO Status.   I have reviewed the Medications.     Review of Systems  Anesthesia Hx:  No problems with previous Anesthesia Denies Hx of Anesthetic complications    Social:  Non-Smoker    Cardiovascular:   Hypertension Denies MI.  Denies CAD.    Denies CABG/stent.   Denies Angina.    Pulmonary:   Denies COPD.  Denies Asthma.  Denies Recent URI.    Renal/:   Denies Chronic Renal Disease.     Hepatic/GI:   Denies GERD. Denies Liver Disease.    Neurological:   Denies TIA. Denies CVA. Neuromuscular Disease,  Denies Seizures.    Endocrine:   Denies Diabetes. Denies Hypothyroidism.    Psych:   Psychiatric History          Physical Exam  General: Well nourished, Cooperative, Alert and Oriented    Airway:  Mallampati: II / II  Mouth Opening: Normal  TM Distance: 4 - 6 cm  Tongue: Normal    Dental:  Intact    Chest/Lungs:  Clear to auscultation, Normal Respiratory Rate    Heart:  Rate: Normal  Rhythm: Regular Rhythm  Sounds: Normal        Anesthesia Plan  Type of Anesthesia, risks & benefits discussed:    Anesthesia Type: Gen Natural Airway  Intra-op Monitoring Plan: Standard ASA Monitors  Induction:  IV  Informed Consent: Informed consent signed with the Patient and all parties understand the risks and agree with anesthesia plan.  All questions answered.   ASA Score: 2    Ready For Surgery From Anesthesia Perspective.     .

## 2022-08-23 NOTE — ANESTHESIA POSTPROCEDURE EVALUATION
Anesthesia Post Evaluation    Patient: Karla Tejeda    Procedure(s) Performed: Procedure(s) (LRB):  EGD (ESOPHAGOGASTRODUODENOSCOPY) (N/A)    Final Anesthesia Type: general      Patient location during evaluation: PACU  Patient participation: Yes- Able to Participate  Level of consciousness: awake and alert and oriented  Post-procedure vital signs: reviewed and stable  Pain management: adequate  Airway patency: patent    PONV status at discharge: No PONV  Anesthetic complications: no      Cardiovascular status: blood pressure returned to baseline  Respiratory status: unassisted, spontaneous ventilation and room air  Hydration status: euvolemic  Follow-up not needed.          Vitals Value Taken Time   /72 08/23/22 0855   Temp 36.4 °C (97.5 °F) 08/23/22 0855   Pulse 54 08/23/22 0855   Resp 14 08/23/22 0835   SpO2 97 % 08/23/22 0855         Event Time   Out of Recovery 09:01:26         Pain/Noam Score: Noam Score: 10 (8/23/2022  8:35 AM)

## 2022-08-24 VITALS
OXYGEN SATURATION: 97 % | RESPIRATION RATE: 14 BRPM | HEART RATE: 54 BPM | TEMPERATURE: 98 F | SYSTOLIC BLOOD PRESSURE: 134 MMHG | DIASTOLIC BLOOD PRESSURE: 72 MMHG

## 2022-08-30 LAB
FINAL PATHOLOGIC DIAGNOSIS: NORMAL
GROSS: NORMAL
Lab: NORMAL

## 2022-09-07 ENCOUNTER — HOSPITAL ENCOUNTER (OUTPATIENT)
Facility: HOSPITAL | Age: 67
Discharge: HOME | End: 2022-09-07
Attending: INTERNAL MEDICINE | Admitting: INTERNAL MEDICINE
Payer: MEDICARE

## 2022-09-07 ENCOUNTER — ANESTHESIA EVENT (OUTPATIENT)
Dept: ENDOSCOPY | Facility: HOSPITAL | Age: 67
End: 2022-09-07
Payer: MEDICARE

## 2022-09-07 ENCOUNTER — ANESTHESIA (OUTPATIENT)
Dept: ENDOSCOPY | Facility: HOSPITAL | Age: 67
End: 2022-09-07
Payer: MEDICARE

## 2022-09-07 DIAGNOSIS — K57.90 DIVERTICULOSIS: ICD-10-CM

## 2022-09-07 DIAGNOSIS — K64.8 INTERNAL HEMORRHOIDS: ICD-10-CM

## 2022-09-07 DIAGNOSIS — K92.1 HEMATOCHEZIA: ICD-10-CM

## 2022-09-07 DIAGNOSIS — K63.5 POLYP OF COLON, UNSPECIFIED PART OF COLON, UNSPECIFIED TYPE: Primary | ICD-10-CM

## 2022-09-07 PROCEDURE — 63600175 PHARM REV CODE 636 W HCPCS: Performed by: NURSE ANESTHETIST, CERTIFIED REGISTERED

## 2022-09-07 PROCEDURE — D9220A PRA ANESTHESIA: ICD-10-PCS | Mod: CRNA,,, | Performed by: NURSE ANESTHETIST, CERTIFIED REGISTERED

## 2022-09-07 PROCEDURE — 25000003 PHARM REV CODE 250: Performed by: NURSE ANESTHETIST, CERTIFIED REGISTERED

## 2022-09-07 PROCEDURE — 45380 COLONOSCOPY AND BIOPSY: CPT | Mod: ,,, | Performed by: INTERNAL MEDICINE

## 2022-09-07 PROCEDURE — 88305 TISSUE EXAM BY PATHOLOGIST: CPT | Mod: 26,,, | Performed by: PATHOLOGY

## 2022-09-07 PROCEDURE — 88305 TISSUE EXAM BY PATHOLOGIST: CPT | Performed by: PATHOLOGY

## 2022-09-07 PROCEDURE — 37000008 HC ANESTHESIA 1ST 15 MINUTES: Performed by: INTERNAL MEDICINE

## 2022-09-07 PROCEDURE — D9220A PRA ANESTHESIA: Mod: CRNA,,, | Performed by: NURSE ANESTHETIST, CERTIFIED REGISTERED

## 2022-09-07 PROCEDURE — 45380 PR COLONOSCOPY,BIOPSY: ICD-10-PCS | Mod: ,,, | Performed by: INTERNAL MEDICINE

## 2022-09-07 PROCEDURE — D9220A PRA ANESTHESIA: Mod: ANES,,, | Performed by: ANESTHESIOLOGY

## 2022-09-07 PROCEDURE — 88305 TISSUE EXAM BY PATHOLOGIST: ICD-10-PCS | Mod: 26,,, | Performed by: PATHOLOGY

## 2022-09-07 PROCEDURE — 37000009 HC ANESTHESIA EA ADD 15 MINS: Performed by: INTERNAL MEDICINE

## 2022-09-07 PROCEDURE — 45380 COLONOSCOPY AND BIOPSY: CPT | Performed by: INTERNAL MEDICINE

## 2022-09-07 PROCEDURE — 27201012 HC FORCEPS, HOT/COLD, DISP: Performed by: INTERNAL MEDICINE

## 2022-09-07 PROCEDURE — D9220A PRA ANESTHESIA: ICD-10-PCS | Mod: ANES,,, | Performed by: ANESTHESIOLOGY

## 2022-09-07 PROCEDURE — 25000003 PHARM REV CODE 250: Performed by: INTERNAL MEDICINE

## 2022-09-07 RX ORDER — LIDOCAINE HCL/PF 100 MG/5ML
SYRINGE (ML) INTRAVENOUS
Status: DISCONTINUED | OUTPATIENT
Start: 2022-09-07 | End: 2022-09-07

## 2022-09-07 RX ORDER — PROPOFOL 10 MG/ML
VIAL (ML) INTRAVENOUS
Status: DISCONTINUED | OUTPATIENT
Start: 2022-09-07 | End: 2022-09-07

## 2022-09-07 RX ORDER — DEXTROMETHORPHAN/PSEUDOEPHED 2.5-7.5/.8
DROPS ORAL
Status: DISCONTINUED | OUTPATIENT
Start: 2022-09-07 | End: 2022-09-07 | Stop reason: HOSPADM

## 2022-09-07 RX ORDER — SODIUM CHLORIDE 9 MG/ML
INJECTION, SOLUTION INTRAVENOUS CONTINUOUS
Status: DISCONTINUED | OUTPATIENT
Start: 2022-09-07 | End: 2022-09-07 | Stop reason: HOSPADM

## 2022-09-07 RX ADMIN — SODIUM CHLORIDE: 0.9 INJECTION, SOLUTION INTRAVENOUS at 09:09

## 2022-09-07 RX ADMIN — PROPOFOL 40 MG: 10 INJECTION, EMULSION INTRAVENOUS at 09:09

## 2022-09-07 RX ADMIN — PROPOFOL 40 MG: 10 INJECTION, EMULSION INTRAVENOUS at 10:09

## 2022-09-07 RX ADMIN — GLYCOPYRROLATE 0.2 MG: 0.2 INJECTION, SOLUTION INTRAMUSCULAR; INTRAVITREAL at 09:09

## 2022-09-07 RX ADMIN — LIDOCAINE HYDROCHLORIDE 50 MG: 20 INJECTION INTRAVENOUS at 09:09

## 2022-09-07 RX ADMIN — PROPOFOL 80 MG: 10 INJECTION, EMULSION INTRAVENOUS at 09:09

## 2022-09-07 NOTE — PROVATION PATIENT INSTRUCTIONS
Discharge Summary/Instructions after an Endoscopic Procedure  Patient Name: Karla Tejeda  Patient MRN: [id number]  Patient YOB: 1955 Wednesday, September 7, 2022  Migel Dobson MD  Dear patient,  As a result of recent federal legislation (The Federal Cures Act), you may   receive lab or pathology results from your procedure in your [facility]   account before your physician is able to contact you. Your physician or   their representative will relay the results to you with their   recommendations at their soonest availability.  Thank you,  RESTRICTIONS:  During your procedure today, you received medications for sedation.  These   medications may affect your judgment, balance and coordination.  Therefore,   for 24 hours, you have the following restrictions:   - DO NOT drive a car, operate machinery, make legal/financial decisions,   sign important papers or drink alcohol.    ACTIVITY:  Today: no heavy lifting, straining or running due to procedural   sedation/anesthesia.  The following day: return to full activity including work.  DIET:  Eat and drink normally unless instructed otherwise.     TREATMENT FOR COMMON SIDE EFFECTS:  - Mild abdominal pain, nausea, belching, bloating or excessive gas:  rest,   eat lightly and use a heating pad.  - Sore Throat: treat with throat lozenges and/or gargle with warm salt   water.  - Because air was used during the procedure, expelling large amounts of air   from your rectum or belching is normal.  - If a bowel prep was taken, you may not have a bowel movement for 1-3 days.    This is normal.  SYMPTOMS TO WATCH FOR AND REPORT TO YOUR PHYSICIAN:  1. Abdominal pain or bloating, other than gas cramps.  2. Chest pain.  3. Back pain.  4. Signs of infection such as: chills or fever occurring within 24 hours   after the procedure.  5. Rectal bleeding, which would show as bright red, maroon, or black stools.   (A tablespoon of blood from the rectum is not serious, especially  if   hemorrhoids are present.)  6. Vomiting.  7. Weakness or dizziness.  GO DIRECTLY TO THE NEAREST EMERGENCY ROOM IF YOU HAVE ANY OF THE FOLLOWING:      Difficulty breathing              Chills and/or fever over 101 F   Persistent vomiting and/or vomiting blood   Severe abdominal pain   Severe chest pain   Black, tarry stools   Bleeding- more than one tablespoon   Any other symptom or condition that you feel may need urgent attention  Your doctor recommends these additional instructions:  If any biopsies were taken, your doctors clinic will contact you in 1 to 2   weeks with any results.  - Patient has a contact number available for emergencies.  The signs and   symptoms of potential delayed complications were discussed with the   patient.  Return to normal activities tomorrow.  Written discharge   instructions were provided to the patient.   - High fiber diet.   - Continue present medications.   - Await pathology results.   - Repeat colonoscopy in 3 years for surveillance.   - Discharge patient to home (ambulatory).   - Return to my office PRN.  For questions, problems or results please call your physician - Migel Dobson MD at Work:  [phone].  [facility] PHONE NUMBER: [phone]  IF A COMPLICATION OR EMERGENCY SITUATION ARISES AND YOU ARE UNABLE TO REACH   YOUR PHYSICIAN - GO DIRECTLY TO THE EMERGENCY ROOM.  Migel Dobson MD  9/7/2022 10:09:33 AM  This report has been verified and signed electronically.  Dear patient,  As a result of recent federal legislation (The Federal Cures Act), you may   receive lab or pathology results from your procedure in your [facility]   account before your physician is able to contact you. Your physician or   their representative will relay the results to you with their   recommendations at their soonest availability.  Thank you,  PROVATION

## 2022-09-07 NOTE — TRANSFER OF CARE
"Anesthesia Transfer of Care Note    Patient: Karla Tejeda    Procedure(s) Performed: Procedure(s) (LRB):  COLONOSCOPY (N/A)    Patient location: PACU    Anesthesia Type: general    Transport from OR: Transported from OR on room air with adequate spontaneous ventilation    Post pain: adequate analgesia    Post assessment: no apparent anesthetic complications and tolerated procedure well    Post vital signs: stable    Level of consciousness: awake, alert and oriented    Nausea/Vomiting: no nausea/vomiting    Complications: none    Transfer of care protocol was followed      Last vitals:   Visit Vitals  /78 (BP Location: Left arm, Patient Position: Lying)   Pulse 62   Temp 36.4 °C (97.5 °F) (Skin)   Resp 18   Ht 5' 5" (1.651 m)   Wt 99.8 kg (220 lb)   SpO2 97%   Breastfeeding No   BMI 36.61 kg/m²     "

## 2022-09-07 NOTE — PLAN OF CARE
Vss, tawnya po fluids, denies pain, ambulates easily. IV removed, catheter intact. Discharge instructions provided and states understanding. States ready to go home.  Discharged from facility with family per wheelchair.

## 2022-09-07 NOTE — ANESTHESIA POSTPROCEDURE EVALUATION
Anesthesia Post Evaluation    Patient: Karla Tejeda    Procedure(s) Performed: Procedure(s) (LRB):  COLONOSCOPY (N/A)    Final Anesthesia Type: general      Patient location during evaluation: PACU  Patient participation: Yes- Able to Participate  Level of consciousness: awake and alert and oriented  Post-procedure vital signs: reviewed and stable  Pain management: adequate  Airway patency: patent    PONV status at discharge: No PONV  Anesthetic complications: no      Cardiovascular status: blood pressure returned to baseline  Respiratory status: unassisted, spontaneous ventilation and room air  Hydration status: euvolemic  Follow-up not needed.          Vitals Value Taken Time   /66 09/07/22 1030   Temp  09/07/22 1036   Pulse 77 09/07/22 1030   Resp 16 09/07/22 1030   SpO2 100 % 09/07/22 1030         No case tracking events are documented in the log.      Pain/Noam Score: Noam Score: 10 (9/7/2022 10:30 AM)

## 2022-09-07 NOTE — ANESTHESIA PREPROCEDURE EVALUATION
09/07/2022  Karla Tejeda is a 67 y.o., female.      Pre-op Assessment    I have reviewed the NPO Status.   I have reviewed the Medications.     Review of Systems  Anesthesia Hx:  No problems with previous Anesthesia    Cardiovascular:   Hypertension    Pulmonary:   Asthma    Hepatic/GI:   PUD, Hiatal Hernia, GERD    Neurological:   Neuromuscular Disease,    Endocrine:  Morbid Obesity / BMI > 40  Psych:   Psychiatric History depression          Physical Exam  General: Well nourished        Anesthesia Plan  Type of Anesthesia, risks & benefits discussed:    Anesthesia Type: Gen Natural Airway  Intra-op Monitoring Plan: Standard ASA Monitors  Induction:  IV  Informed Consent: Informed consent signed with the Patient and all parties understand the risks and agree with anesthesia plan.  All questions answered.   ASA Score: 3    Ready For Surgery From Anesthesia Perspective.     .

## 2022-09-07 NOTE — H&P
CC: Hematochezia, change in stool caliber    67 year old female with above. States that symptoms are stable, no alleviating/exacerbating factors.  No ongoing bleeding or weight loss.     ROS:  No headache, no fever/chills, no chest pain/SOB, no nausea/vomiting/diarrhea/constipation/GI bleeding/abdominal pain, no dysuria/hematuria.    VSSAF   Exam:   Alert and oriented x 3; no apparent distress   PERRLA, sclera anicteric  CV: Regular rate/rhythm, normal PMI   Lungs: Clear bilaterally with no wheeze/rales   Abdomen: Soft, NT/ND, normal bowel sounds   Ext: No cyanosis, clubbing     Impression:   As above    Plan:   Proceed with endoscopy. Further recs to follow.

## 2022-09-08 VITALS
HEIGHT: 65 IN | SYSTOLIC BLOOD PRESSURE: 124 MMHG | RESPIRATION RATE: 16 BRPM | OXYGEN SATURATION: 100 % | DIASTOLIC BLOOD PRESSURE: 66 MMHG | BODY MASS INDEX: 36.65 KG/M2 | HEART RATE: 77 BPM | TEMPERATURE: 98 F | WEIGHT: 220 LBS

## 2022-09-08 DIAGNOSIS — R29.6 FREQUENT FALLS: ICD-10-CM

## 2022-09-08 DIAGNOSIS — R26.89 BALANCE DISORDER: ICD-10-CM

## 2022-09-08 DIAGNOSIS — N64.4 BREAST PAIN: Primary | ICD-10-CM

## 2022-09-09 LAB
FINAL PATHOLOGIC DIAGNOSIS: NORMAL
GROSS: NORMAL
Lab: NORMAL

## 2022-09-29 ENCOUNTER — TELEPHONE (OUTPATIENT)
Dept: GASTROENTEROLOGY | Facility: CLINIC | Age: 67
End: 2022-09-29
Payer: MEDICAID

## 2022-09-29 NOTE — TELEPHONE ENCOUNTER
----- Message from Migel Rivera MD sent at 9/12/2022  4:08 PM CDT -----  Please advise patient that polyp pathology was reviewed and is benign and is the adenomatous type which is precancerous/risk factor for cancer.  Repeat colonoscopy recommended in 3 years.  Place reminder in system for repeat colonoscopy.

## 2022-09-29 NOTE — LETTER
September 29, 2022    Karla Tejeda  [address]  [place] [address]             [facility]  [address], [address]  [place] [address]  Phone: [phone] Dear Ms. Tejeda:    Our office has been unable to reach you by phone to give you test results.Results per Dr. Rivera-  Please advise patient that polyp pathology was reviewed and is benign and is the adenomatous type which is precancerous/risk factor for cancer.  Repeat colonoscopy recommended in 3 years.     If you have any questions or concerns, please don't hesitate to call.    Sincerely,        MD Dat Rivas LPN

## 2022-10-28 ENCOUNTER — OFFICE VISIT (OUTPATIENT)
Dept: PULMONOLOGY | Facility: CLINIC | Age: 67
End: 2022-10-28
Payer: MEDICARE

## 2022-10-28 VITALS
WEIGHT: 233.94 LBS | SYSTOLIC BLOOD PRESSURE: 127 MMHG | HEART RATE: 73 BPM | OXYGEN SATURATION: 97 % | HEIGHT: 65 IN | BODY MASS INDEX: 38.98 KG/M2 | DIASTOLIC BLOOD PRESSURE: 92 MMHG

## 2022-10-28 DIAGNOSIS — E66.01 CLASS 2 SEVERE OBESITY DUE TO EXCESS CALORIES WITH SERIOUS COMORBIDITY AND BODY MASS INDEX (BMI) OF 38.0 TO 38.9 IN ADULT: ICD-10-CM

## 2022-10-28 DIAGNOSIS — R53.83 FATIGUE, UNSPECIFIED TYPE: ICD-10-CM

## 2022-10-28 DIAGNOSIS — J45.51 SEVERE PERSISTENT ASTHMA WITH ACUTE EXACERBATION: Primary | ICD-10-CM

## 2022-10-28 PROCEDURE — 3074F PR MOST RECENT SYSTOLIC BLOOD PRESSURE < 130 MM HG: ICD-10-PCS | Mod: CPTII,S$GLB,, | Performed by: NURSE PRACTITIONER

## 2022-10-28 PROCEDURE — 1101F PR PT FALLS ASSESS DOC 0-1 FALLS W/OUT INJ PAST YR: ICD-10-PCS | Mod: CPTII,S$GLB,, | Performed by: NURSE PRACTITIONER

## 2022-10-28 PROCEDURE — 1101F PT FALLS ASSESS-DOCD LE1/YR: CPT | Mod: CPTII,S$GLB,, | Performed by: NURSE PRACTITIONER

## 2022-10-28 PROCEDURE — 3074F SYST BP LT 130 MM HG: CPT | Mod: CPTII,S$GLB,, | Performed by: NURSE PRACTITIONER

## 2022-10-28 PROCEDURE — 1159F PR MEDICATION LIST DOCUMENTED IN MEDICAL RECORD: ICD-10-PCS | Mod: CPTII,S$GLB,, | Performed by: NURSE PRACTITIONER

## 2022-10-28 PROCEDURE — 1159F MED LIST DOCD IN RCRD: CPT | Mod: CPTII,S$GLB,, | Performed by: NURSE PRACTITIONER

## 2022-10-28 PROCEDURE — 99214 PR OFFICE/OUTPT VISIT, EST, LEVL IV, 30-39 MIN: ICD-10-PCS | Mod: S$GLB,,, | Performed by: NURSE PRACTITIONER

## 2022-10-28 PROCEDURE — 4010F PR ACE/ARB THEARPY RXD/TAKEN: ICD-10-PCS | Mod: CPTII,S$GLB,, | Performed by: NURSE PRACTITIONER

## 2022-10-28 PROCEDURE — 3080F PR MOST RECENT DIASTOLIC BLOOD PRESSURE >= 90 MM HG: ICD-10-PCS | Mod: CPTII,S$GLB,, | Performed by: NURSE PRACTITIONER

## 2022-10-28 PROCEDURE — 1126F AMNT PAIN NOTED NONE PRSNT: CPT | Mod: CPTII,S$GLB,, | Performed by: NURSE PRACTITIONER

## 2022-10-28 PROCEDURE — 99999 PR PBB SHADOW E&M-EST. PATIENT-LVL IV: ICD-10-PCS | Mod: PBBFAC,,, | Performed by: NURSE PRACTITIONER

## 2022-10-28 PROCEDURE — 99214 OFFICE O/P EST MOD 30 MIN: CPT | Mod: PBBFAC,PO | Performed by: NURSE PRACTITIONER

## 2022-10-28 PROCEDURE — 1126F PR PAIN SEVERITY QUANTIFIED, NO PAIN PRESENT: ICD-10-PCS | Mod: CPTII,S$GLB,, | Performed by: NURSE PRACTITIONER

## 2022-10-28 PROCEDURE — 3288F FALL RISK ASSESSMENT DOCD: CPT | Mod: CPTII,S$GLB,, | Performed by: NURSE PRACTITIONER

## 2022-10-28 PROCEDURE — 3080F DIAST BP >= 90 MM HG: CPT | Mod: CPTII,S$GLB,, | Performed by: NURSE PRACTITIONER

## 2022-10-28 PROCEDURE — 99999 PR PBB SHADOW E&M-EST. PATIENT-LVL IV: CPT | Mod: PBBFAC,,, | Performed by: NURSE PRACTITIONER

## 2022-10-28 PROCEDURE — 3288F PR FALLS RISK ASSESSMENT DOCUMENTED: ICD-10-PCS | Mod: CPTII,S$GLB,, | Performed by: NURSE PRACTITIONER

## 2022-10-28 PROCEDURE — 99214 OFFICE O/P EST MOD 30 MIN: CPT | Mod: S$GLB,,, | Performed by: NURSE PRACTITIONER

## 2022-10-28 PROCEDURE — 4010F ACE/ARB THERAPY RXD/TAKEN: CPT | Mod: CPTII,S$GLB,, | Performed by: NURSE PRACTITIONER

## 2022-10-28 RX ORDER — FLUTICASONE FUROATE, UMECLIDINIUM BROMIDE AND VILANTEROL TRIFENATATE 200; 62.5; 25 UG/1; UG/1; UG/1
1 POWDER RESPIRATORY (INHALATION) DAILY
Qty: 60 EACH | Refills: 11 | Status: SHIPPED | OUTPATIENT
Start: 2022-10-28

## 2022-10-28 RX ORDER — PREDNISONE 20 MG/1
TABLET ORAL
Qty: 12 TABLET | Refills: 0 | Status: SHIPPED | OUTPATIENT
Start: 2022-10-28 | End: 2022-12-02

## 2022-10-28 RX ORDER — FLUTICASONE FUROATE, UMECLIDINIUM BROMIDE AND VILANTEROL TRIFENATATE 200; 62.5; 25 UG/1; UG/1; UG/1
1 POWDER RESPIRATORY (INHALATION) DAILY
Qty: 60 EACH | Refills: 0 | Status: SHIPPED | OUTPATIENT
Start: 2022-10-28

## 2022-10-28 RX ORDER — PREDNISONE 20 MG/1
TABLET ORAL
Qty: 18 TABLET | Refills: 0 | Status: SHIPPED | OUTPATIENT
Start: 2022-10-28

## 2022-10-28 RX ORDER — ALBUTEROL SULFATE 1.25 MG/3ML
1.25 SOLUTION RESPIRATORY (INHALATION) EVERY 6 HOURS PRN
Qty: 75 ML | Refills: 11 | Status: SHIPPED | OUTPATIENT
Start: 2022-10-28 | End: 2023-10-28

## 2022-10-28 RX ORDER — ALBUTEROL SULFATE 90 UG/1
2 AEROSOL, METERED RESPIRATORY (INHALATION) EVERY 6 HOURS PRN
Qty: 18 G | Refills: 11 | Status: SHIPPED | OUTPATIENT
Start: 2022-10-28

## 2022-10-28 RX ORDER — TELMISARTAN 40 MG/1
1 TABLET ORAL ONCE
COMMUNITY

## 2022-10-28 NOTE — PROGRESS NOTES
10/28/2022    Karla WU Nikhil  New Patient Consult    Chief Complaint   Patient presents with    Asthma     Pt states that it started up around March 2022      Wheezing     All day     Shortness of Breath     All day     Chest Pain     Every now and then     Cough     Pt states that she has a dry cough     Fatigue     Sometimes     Dizziness     Sometimes        HPI:  10/28/2022:  Recently moved back to [place] from [place].   In office today alone - states seen by Pulmonology approx 30 years ago - diagnosed with asthma.  Shortness of breath: Worsening over the last six months, associated with wheezing, chest tightness. Occurs with conversation, and exertion. Affecting ADLS. Can't wash the dishes without stopping to catch her breath. States breathing gets better with sleeping more upright than flat.   Cough: Nonproductive, no time correlation identified, associated with nocturnal arousals.   Not currently on maintenance inhaler - using albuterol inhaler approx 10x per day.   States underwent sleep study in the past, was told she does not have sleep apnea.   Endorses daytime fatigue, nocturnal arousals, morning headaches.           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 No prior chest surgery.          The chief compliant  problem is new to me.   PFSH:  Past Medical History:   Diagnosis Date    Carpal tunnel syndrome     Colon polyp     Diverticulitis     Diverticulosis     Encounter for blood transfusion     Esophageal ulcer     GERD (gastroesophageal reflux disease)     Hiatal hernia     Hypertension     Mild intermittent asthma, uncomplicated          Past Surgical History:   Procedure Laterality Date    ABDOMINAL SURGERY      bladder lift    APPENDECTOMY  ~2000  "   CARPAL TUNNEL RELEASE      [facility]    CHOLECYSTECTOMY  ~2000    COLONOSCOPY  05/16/2018    Dr. Ennis, sent for scanning: hemorrhoids, diverticulosis, 4 colon polyps removed    COLONOSCOPY  12/12/2012    Dr. Ennis, sent for scanning: hemorrhoids, diverticulosis, 3 colon polyps removed    COLONOSCOPY  09/13/2021    Dr. Ennis, sent for scanning: hemorrhoids, "a few 2 to 4 mm polyps in the rectum, removed with a jumbo cold forceps", diverticulosis, repeat in 5 years for surveillance    COLONOSCOPY N/A 9/7/2022    Procedure: COLONOSCOPY;  Surgeon: Migel Rivera MD;  Location: [facility] ENDO;  Service: Endoscopy;  Laterality: N/A;    cystomoty      ESOPHAGOGASTRODUODENOSCOPY  05/16/2018    Dr. Ennis, sent for scanning: mild non-erosive gastritis, esophageal ulcer & erosion; mild esophagitis; hiatal hernia    ESOPHAGOGASTRODUODENOSCOPY  10/31/2018    Dr. Ennis, sent for scanning: small hiatal hernia    ESOPHAGOGASTRODUODENOSCOPY N/A 8/23/2022    Procedure: EGD (ESOPHAGOGASTRODUODENOSCOPY);  Surgeon: Migel Rivera MD;  Location: [facility] ENDO;  Service: Endoscopy;  Laterality: N/A;    HERNIA MESH REMOVAL N/A     HYSTERECTOMY      KNEE SURGERY      BL TKA    SHOULDER SURGERY      TSA right     Social History     Tobacco Use    Smoking status: Never    Smokeless tobacco: Never   Substance Use Topics    Alcohol use: Never    Drug use: Never     Family History   Problem Relation Age of Onset    Prostate cancer Brother     Colon cancer Neg Hx     Stomach cancer Neg Hx     Esophageal cancer Neg Hx     Crohn's disease Neg Hx     Ulcerative colitis Neg Hx     Celiac disease Neg Hx      Review of patient's allergies indicates:   Allergen Reactions    Penicillins      Other reaction(s): Unknown  Other reaction(s): Angioedema    Codeine Hives     Other reaction(s): Unknown     I have reviewed past medical, family, and social history. I have reviewed previous nurse notes.    Performance Status:The patient's activity " "level is functions out of house    Review of Systems   Constitutional:  Positive for activity change, diaphoresis and fatigue. Negative for appetite change, chills, fever and unexpected weight change.   HENT:  Negative for congestion, postnasal drip, rhinorrhea, sinus pressure, sinus pain, sore throat and trouble swallowing.    Eyes:  Negative for photophobia and visual disturbance.   Respiratory:  Positive for cough, chest tightness, shortness of breath and wheezing. Negative for choking.    Cardiovascular:  Negative for chest pain, palpitations and leg swelling.   Gastrointestinal:  Negative for abdominal distention, abdominal pain, blood in stool, constipation, diarrhea, nausea and vomiting.   Genitourinary:  Negative for difficulty urinating, dysuria, flank pain and hematuria.   Musculoskeletal:  Positive for arthralgias, back pain and neck pain. Negative for gait problem and joint swelling.   Skin:  Negative for rash and wound.   Allergic/Immunologic: Positive for environmental allergies and food allergies.        Tomatoes, cats, and dogs.   Neurological:  Positive for dizziness and light-headedness. Negative for seizures, syncope, weakness, numbness and headaches.   Hematological:  Does not bruise/bleed easily.   Psychiatric/Behavioral:  Positive for sleep disturbance. Negative for confusion. The patient is not nervous/anxious.        Exam:Comprehensive exam done. BP (!) 127/92 (BP Location: Left arm, Patient Position: Sitting, BP Method: Large (Automatic))   Pulse 73   Ht 5' 5" (1.651 m)   Wt 106.1 kg (233 lb 14.5 oz)   SpO2 97% Comment: room air at rest  BMI 38.92 kg/m²   Exam included Vitals as listed  Constitutional: She is oriented to person, place, and time. She appears well-developed. No distress.   Nose: Nose normal.   Mouth/Throat: Uvula is midline, oropharynx is clear and moist and mucous membranes are normal. No dental caries. No oropharyngeal exudate, posterior oropharyngeal edema, posterior " oropharyngeal erythema or tonsillar abscesses.    Eyes: Pupils are equal, round, and reactive to light.   Neck: No JVD present. No thyromegaly present.   Cardiovascular: Normal rate, regular rhythm and normal heart sounds. Exam reveals no gallop and no friction rub.   No murmur heard.  Pulmonary/Chest: Effort normal and breath sounds normal. No accessory muscle usage or stridor. No apnea and no tachypnea. No respiratory distress, decreased breath sounds, rhonchi, rales, or tenderness. Diffuse wheezing throughout, on room air, in no acute distress.  Abdominal: Soft. He exhibits no mass. There is no tenderness. No hepatosplenomegaly, hernias and normoactive bowel sounds  Musculoskeletal: Normal range of motion. exhibits no edema.   Neurological:  alert and oriented to person, place, and time. not disoriented.   Skin: Skin is warm and dry. Capillary refill takes less 2 sec. No cyanosis or erythema. No pallor. Nails show no clubbing.   Psychiatric: normal mood and affect. behavior is normal. Judgment and thought content normal.       Radiographs (ct chest and cxr) reviewed: view by direct vision   CT Abdomen Pelvis With Contrast 7/28/2022 The lung bases are clear.       Labs reviewed    Lab Results   Component Value Date    WBC 6.39 07/27/2022    RBC 4.59 07/27/2022    HGB 12.6 07/27/2022    HCT 39.2 07/27/2022    MCV 85 07/27/2022    MCH 27.5 07/27/2022    MCHC 32.1 07/27/2022    RDW 13.9 07/27/2022     07/27/2022    MPV 10.2 07/27/2022       PFT was not done  Pulmonary Functions Testing Results:        Plan:  Clinical impression is resonably certain and repeated evaluation prn +/- follow up will be needed as below.    Karla was seen today for asthma, wheezing, shortness of breath, chest pain, cough, fatigue and dizziness.    Diagnoses and all orders for this visit:    Severe persistent asthma with acute exacerbation  -     predniSONE (DELTASONE) 20 MG tablet; Take three tablets per day for three days. Take two  tablets per day for three days. One pill per day for three days.  -     NEBULIZER FOR HOME USE  -     albuterol (ACCUNEB) 1.25 mg/3 mL Nebu; Take 3 mLs (1.25 mg total) by nebulization every 6 (six) hours as needed. Rescue  -     fluticasone-umeclidin-vilanter (TRELEGY ELLIPTA) 200-62.5-25 mcg inhaler; Inhale 1 puff into the lungs once daily.  -     albuterol (PROVENTIL/VENTOLIN HFA) 90 mcg/actuation inhaler; Inhale 2 puffs into the lungs every 6 (six) hours as needed for Wheezing or Shortness of Breath. Rescue  -     fluticasone-umeclidin-vilanter (TRELEGY ELLIPTA) 200-62.5-25 mcg inhaler; Inhale 1 puff into the lungs once daily.  -     predniSONE (DELTASONE) 20 MG tablet; Take one pill per day for three days. Repeat as needed for shortness of breath, wheezing, cough.  -     Complete PFT with bronchodilator; Future    Fatigue, unspecified type    Class 2 severe obesity due to excess calories with serious comorbidity and body mass index (BMI) of 38.0 to 38.9 in adult  Body mass index is 38.92 kg/m². Morbid obesity complicates all aspects of disease management from diagnostic modalities to treatment. Weight loss encouraged and health benefits explained to patient. Nutritional counseling and physical activity encouraged.       Follow up in about 6 weeks (around 12/9/2022), or if symptoms worsen or fail to improve.    Discussed with patient above for education the following:      Patient Instructions   Trelegy is your new maintenance inhaler, This is ONE PUFF ONCE PER DAY.  This inhaler contains an inhaled steroid component. Rinse mouth after each use due to risk for thrush development. If mouth or tongue develops white sores please contact the clinic and I will order a prescription mouth wash.     Continue Albuterol inhaler as needed for shortness of breath, wheezing, cough.    May high Eosinophilic asthma? Can check CBC in the future.    Prednisone - take the 9 day regiment as prescribed. Bring the other regiment with  you on your cruise.    Nebulized treatments, use as needed.    I ordered a Lung Function Test to evaluate lung strength. Please complete prior to next appointment.     Continue current medication regiment. Keep follow up appointment as scheduled. Please call the office if you have any questions or concerns.

## 2022-10-28 NOTE — PATIENT INSTRUCTIONS
Trelegy is your new maintenance inhaler, This is ONE PUFF ONCE PER DAY.  This inhaler contains an inhaled steroid component. Rinse mouth after each use due to risk for thrush development. If mouth or tongue develops white sores please contact the clinic and I will order a prescription mouth wash.     Continue Albuterol inhaler as needed for shortness of breath, wheezing, cough.    May high Eosinophilic asthma? Can check CBC in the future.    Prednisone - take the 9 day regiment as prescribed. Bring the other regiment with you on your cruise.    Nebulized treatments, use as needed.    I ordered a Lung Function Test to evaluate lung strength. Please complete prior to next appointment.     Continue current medication regiment. Keep follow up appointment as scheduled. Please call the office if you have any questions or concerns.

## 2022-11-04 ENCOUNTER — TELEPHONE (OUTPATIENT)
Dept: PULMONOLOGY | Facility: CLINIC | Age: 67
End: 2022-11-04
Payer: MEDICARE

## 2022-11-04 NOTE — TELEPHONE ENCOUNTER
Faxed everything to [facility] with orders.   ----- Message from Catrina Christie sent at 11/4/2022 11:23 AM CDT -----  Contact: Crystal Rojas with [facility] is calling in regards to the nebulizer order that she has received from your office. Since patient just switched to [facility] they will need the prescription for the nebulizer, letter of medical necessity form, clinical notes and the MD order faxed over to [facility] at #[phone]. If you have any questions she can be reached at [phone]. Thank You.

## 2022-11-30 ENCOUNTER — HOSPITAL ENCOUNTER (OUTPATIENT)
Dept: PULMONOLOGY | Facility: HOSPITAL | Age: 67
Discharge: HOME | End: 2022-11-30
Attending: NURSE PRACTITIONER
Payer: MEDICARE

## 2022-11-30 DIAGNOSIS — J45.51 SEVERE PERSISTENT ASTHMA WITH ACUTE EXACERBATION: ICD-10-CM

## 2022-11-30 PROCEDURE — 94060 EVALUATION OF WHEEZING: CPT | Mod: 26,,, | Performed by: INTERNAL MEDICINE

## 2022-11-30 PROCEDURE — 94727 PR PULM FUNCTION TEST BY GAS: ICD-10-PCS | Mod: 26,,, | Performed by: INTERNAL MEDICINE

## 2022-11-30 PROCEDURE — 94727 GAS DIL/WSHOT DETER LNG VOL: CPT | Mod: 26,,, | Performed by: INTERNAL MEDICINE

## 2022-11-30 PROCEDURE — 94727 GAS DIL/WSHOT DETER LNG VOL: CPT

## 2022-11-30 PROCEDURE — 94060 EVALUATION OF WHEEZING: CPT

## 2022-11-30 PROCEDURE — 94729 PR C02/MEMBANE DIFFUSE CAPACITY: ICD-10-PCS | Mod: 26,,, | Performed by: INTERNAL MEDICINE

## 2022-11-30 PROCEDURE — 94060 PR EVAL OF BRONCHOSPASM: ICD-10-PCS | Mod: 26,,, | Performed by: INTERNAL MEDICINE

## 2022-11-30 PROCEDURE — 94729 DIFFUSING CAPACITY: CPT

## 2022-11-30 PROCEDURE — 94729 DIFFUSING CAPACITY: CPT | Mod: 26,,, | Performed by: INTERNAL MEDICINE

## 2022-12-02 ENCOUNTER — OFFICE VISIT (OUTPATIENT)
Dept: PULMONOLOGY | Facility: CLINIC | Age: 67
End: 2022-12-02
Payer: MEDICARE

## 2022-12-02 VITALS
DIASTOLIC BLOOD PRESSURE: 82 MMHG | HEIGHT: 65 IN | HEART RATE: 89 BPM | OXYGEN SATURATION: 98 % | WEIGHT: 228.5 LBS | SYSTOLIC BLOOD PRESSURE: 138 MMHG | BODY MASS INDEX: 38.07 KG/M2

## 2022-12-02 DIAGNOSIS — J45.50 SEVERE PERSISTENT ASTHMA WITHOUT COMPLICATION: ICD-10-CM

## 2022-12-02 LAB
BRPFT: NORMAL
DLCO ADJ PRE: 16.12 ML/(MIN*MMHG)
DLCO SINGLE BREATH LLN: 17.02
DLCO SINGLE BREATH PRE REF: 70.8 %
DLCO SINGLE BREATH REF: 22.76
DLCOC SBVA LLN: 2.95
DLCOC SBVA PRE REF: 105.1 %
DLCOC SBVA REF: 4.3
DLCOC SINGLE BREATH LLN: 17.02
DLCOC SINGLE BREATH PRE REF: 70.8 %
DLCOC SINGLE BREATH REF: 22.76
DLCOVA LLN: 2.95
DLCOVA PRE REF: 105.1 %
DLCOVA PRE: 4.51 ML/(MIN*MMHG*L)
DLCOVA REF: 4.3
DLVAADJ PRE: 4.51 ML/(MIN*MMHG*L)
ERVN2 LLN: -16449.31
ERVN2 PRE REF: 28.5 %
ERVN2 PRE: 0.2 L
ERVN2 REF: 0.69
FEF 25 75 CHG: -2.5 %
FEF 25 75 LLN: 0.67
FEF 25 75 POST REF: 83.3 %
FEF 25 75 PRE REF: 85.4 %
FEF 25 75 REF: 1.76
FET100 CHG: 13.6 %
FEV1 CHG: 0.5 %
FEV1 FVC CHG: -1.6 %
FEV1 FVC LLN: 66
FEV1 FVC POST REF: 95.1 %
FEV1 FVC PRE REF: 96.6 %
FEV1 FVC REF: 78
FEV1 LLN: 1.45
FEV1 POST REF: 89.2 %
FEV1 PRE REF: 88.8 %
FEV1 REF: 2.06
FRCN2 LLN: 2.01
FRCN2 PRE REF: 63.8 %
FRCN2 REF: 2.83
FVC CHG: 2.2 %
FVC LLN: 1.88
FVC POST REF: 93.2 %
FVC PRE REF: 91.2 %
FVC REF: 2.65
IVC PRE: 1.99 L
IVC SINGLE BREATH LLN: 1.88
IVC SINGLE BREATH PRE REF: 75.1 %
IVC SINGLE BREATH REF: 2.65
MVV LLN: 79
MVV PRE REF: 49.8 %
MVV REF: 92
PEF CHG: -11.9 %
PEF LLN: 3.15
PEF POST REF: 89.2 %
PEF PRE REF: 101.2 %
PEF REF: 5.29
POST FEF 25 75: 1.46 L/S
POST FET 100: 9.02 SEC
POST FEV1 FVC: 74.51 %
POST FEV1: 1.84 L
POST FVC: 2.47 L
POST PEF: 4.72 L/S
PRE DLCO: 16.12 ML/(MIN*MMHG)
PRE FEF 25 75: 1.5 L/S
PRE FET 100: 7.94 SEC
PRE FEV1 FVC: 75.75 %
PRE FEV1: 1.83 L
PRE FRC N2: 1.81 L
PRE FVC: 2.42 L
PRE MVV: 46 L/MIN
PRE PEF: 5.35 L/S
RVN2 LLN: 1.57
RVN2 PRE REF: 74.6 %
RVN2 PRE: 1.6 L
RVN2 REF: 2.14
RVN2TLCN2 LLN: 32.83
RVN2TLCN2 PRE REF: 93.5 %
RVN2TLCN2 PRE: 39.65 %
RVN2TLCN2 REF: 42.42
TLCN2 LLN: 4.31
TLCN2 PRE REF: 76.1 %
TLCN2 PRE: 4.03 L
TLCN2 REF: 5.3
VA PRE: 3.57 L
VA SINGLE BREATH LLN: 5.15
VA SINGLE BREATH PRE REF: 69.4 %
VA SINGLE BREATH REF: 5.15
VCMAXN2 LLN: 1.88
VCMAXN2 PRE REF: 91.9 %
VCMAXN2 PRE: 2.43 L
VCMAXN2 REF: 2.65

## 2022-12-02 PROCEDURE — 99214 OFFICE O/P EST MOD 30 MIN: CPT | Mod: S$GLB,,, | Performed by: NURSE PRACTITIONER

## 2022-12-02 PROCEDURE — 4010F ACE/ARB THERAPY RXD/TAKEN: CPT | Mod: CPTII,S$GLB,, | Performed by: NURSE PRACTITIONER

## 2022-12-02 PROCEDURE — 99999 PR PBB SHADOW E&M-EST. PATIENT-LVL V: ICD-10-PCS | Mod: PBBFAC,,, | Performed by: NURSE PRACTITIONER

## 2022-12-02 PROCEDURE — 3008F PR BODY MASS INDEX (BMI) DOCUMENTED: ICD-10-PCS | Mod: CPTII,S$GLB,, | Performed by: NURSE PRACTITIONER

## 2022-12-02 PROCEDURE — 1126F PR PAIN SEVERITY QUANTIFIED, NO PAIN PRESENT: ICD-10-PCS | Mod: CPTII,S$GLB,, | Performed by: NURSE PRACTITIONER

## 2022-12-02 PROCEDURE — 1159F PR MEDICATION LIST DOCUMENTED IN MEDICAL RECORD: ICD-10-PCS | Mod: CPTII,S$GLB,, | Performed by: NURSE PRACTITIONER

## 2022-12-02 PROCEDURE — 1126F AMNT PAIN NOTED NONE PRSNT: CPT | Mod: CPTII,S$GLB,, | Performed by: NURSE PRACTITIONER

## 2022-12-02 PROCEDURE — 3075F SYST BP GE 130 - 139MM HG: CPT | Mod: CPTII,S$GLB,, | Performed by: NURSE PRACTITIONER

## 2022-12-02 PROCEDURE — 3008F BODY MASS INDEX DOCD: CPT | Mod: CPTII,S$GLB,, | Performed by: NURSE PRACTITIONER

## 2022-12-02 PROCEDURE — 3075F PR MOST RECENT SYSTOLIC BLOOD PRESS GE 130-139MM HG: ICD-10-PCS | Mod: CPTII,S$GLB,, | Performed by: NURSE PRACTITIONER

## 2022-12-02 PROCEDURE — 99999 PR PBB SHADOW E&M-EST. PATIENT-LVL V: CPT | Mod: PBBFAC,,, | Performed by: NURSE PRACTITIONER

## 2022-12-02 PROCEDURE — 3079F DIAST BP 80-89 MM HG: CPT | Mod: CPTII,S$GLB,, | Performed by: NURSE PRACTITIONER

## 2022-12-02 PROCEDURE — 3079F PR MOST RECENT DIASTOLIC BLOOD PRESSURE 80-89 MM HG: ICD-10-PCS | Mod: CPTII,S$GLB,, | Performed by: NURSE PRACTITIONER

## 2022-12-02 PROCEDURE — 4010F PR ACE/ARB THEARPY RXD/TAKEN: ICD-10-PCS | Mod: CPTII,S$GLB,, | Performed by: NURSE PRACTITIONER

## 2022-12-02 PROCEDURE — 99214 PR OFFICE/OUTPT VISIT, EST, LEVL IV, 30-39 MIN: ICD-10-PCS | Mod: S$GLB,,, | Performed by: NURSE PRACTITIONER

## 2022-12-02 PROCEDURE — 1159F MED LIST DOCD IN RCRD: CPT | Mod: CPTII,S$GLB,, | Performed by: NURSE PRACTITIONER

## 2022-12-02 RX ORDER — IPRATROPIUM BROMIDE AND ALBUTEROL SULFATE 2.5; .5 MG/3ML; MG/3ML
SOLUTION RESPIRATORY (INHALATION)
COMMUNITY

## 2022-12-02 NOTE — PROGRESS NOTES
12/5/2022    Karla Tejeda  In office visit    Chief Complaint   Patient presents with    Follow-up     Test results.       HPI:  12/2/2022:  Recent cruise to [place] [facility] had a great time.  Using nebulizer machine as needed - approx 2x per day with great benefit.  Using Trelegy once per day with great benefit.   Completed two Prednisone regiments since prior visit with great benefit.  Flying out to [place] in January 2023.         10/28/2022:  Recently moved back to [place] from [place].   In office today alone - [facility] seen by Pulmonology approx 30 years ago - diagnosed with asthma.  Shortness of breath: Worsening over the last six months, associated with wheezing, chest tightness. Occurs with conversation, and exertion. Affecting ADLS. Can't wash the dishes without stopping to catch her breath. States breathing gets better with sleeping more upright than flat.   Cough: Nonproductive, no time correlation identified, associated with nocturnal arousals.   Not currently on maintenance inhaler - using albuterol inhaler approx 10x per day.   States underwent sleep study in the past, was told she does not have sleep apnea.   Endorses daytime fatigue, nocturnal arousals, morning headaches.   Patient Instructions   Trelegy is your new maintenance inhaler, This is ONE PUFF ONCE PER DAY.  This inhaler contains an inhaled steroid component. Rinse mouth after each use due to risk for thrush development. If mouth or tongue develops white sores please contact the clinic and I will order a prescription mouth wash.   Continue Albuterol inhaler as needed for shortness of breath, wheezing, cough.  May high Eosinophilic asthma? Can check CBC in the future.  Prednisone - take the 9 day regiment as prescribed. Bring the other regiment with you on your cruise.  Nebulized treatments, use as needed.  I ordered a Lung Function Test to evaluate lung strength. Please complete prior to next appointment.   Continue current medication regiment.  "Keep follow up appointment as scheduled. Please call the office if you have any questions or concerns.         Social Hx: Lives alone - no animals in the house. Former , [employer]. No Asbestosis exposure, Smoking Hx: Never smoker  Family Hx: Sister passed away from Lung Cancer, Sister COPD, Sister, Brother with Asthma  Medical Hx: Previous pneumonia; Previous left shoulder surgery, last in 2012. No prior chest surgery.        The chief compliant  problem varies with instability at times.  PFSH:  Past Medical History:   Diagnosis Date    Carpal tunnel syndrome     Colon polyp     Diverticulitis     Diverticulosis     Encounter for blood transfusion     Esophageal ulcer     GERD (gastroesophageal reflux disease)     Hiatal hernia     Hypertension     Mild intermittent asthma, uncomplicated          Past Surgical History:   Procedure Laterality Date    ABDOMINAL SURGERY      bladder lift    APPENDECTOMY  ~2000    CARPAL TUNNEL RELEASE      [facility]    CHOLECYSTECTOMY  ~2000    COLONOSCOPY  05/16/2018    Dr. Ennis, sent for scanning: hemorrhoids, diverticulosis, 4 colon polyps removed    COLONOSCOPY  12/12/2012    Dr. Ennis, sent for scanning: hemorrhoids, diverticulosis, 3 colon polyps removed    COLONOSCOPY  09/13/2021    Dr. Ennis, sent for scanning: hemorrhoids, "a few 2 to 4 mm polyps in the rectum, removed with a jumbo cold forceps", diverticulosis, repeat in 5 years for surveillance    COLONOSCOPY N/A 9/7/2022    Procedure: COLONOSCOPY;  Surgeon: Migel Rivera MD;  Location: [facility];  Service: Endoscopy;  Laterality: N/A;    cystomoty      ESOPHAGOGASTRODUODENOSCOPY  05/16/2018    Dr. Ennis, sent for scanning: mild non-erosive gastritis, esophageal ulcer & erosion; mild esophagitis; hiatal hernia    ESOPHAGOGASTRODUODENOSCOPY  10/31/2018    Dr. Ennis, sent for scanning: small hiatal hernia    ESOPHAGOGASTRODUODENOSCOPY N/A 8/23/2022    Procedure: EGD (ESOPHAGOGASTRODUODENOSCOPY);  " "Surgeon: Migel Rivera MD;  Location: [facility];  Service: Endoscopy;  Laterality: N/A;    HERNIA MESH REMOVAL N/A     HYSTERECTOMY      KNEE SURGERY      BL TKA    SHOULDER SURGERY      TSA right     Social History     Tobacco Use    Smoking status: Never    Smokeless tobacco: Never   Substance Use Topics    Alcohol use: Never    Drug use: Never     Family History   Problem Relation Age of Onset    Prostate cancer Brother     Colon cancer Neg Hx     Stomach cancer Neg Hx     Esophageal cancer Neg Hx     Crohn's disease Neg Hx     Ulcerative colitis Neg Hx     Celiac disease Neg Hx      Review of patient's allergies indicates:   Allergen Reactions    Penicillins      Other reaction(s): Unknown  Other reaction(s): Angioedema    Codeine Hives     Other reaction(s): Unknown     I have reviewed past medical, family, and social history. I have reviewed previous nurse notes.    Performance Status:The patient's activity level is functions out of house    Review of Systems:  a review of eleven systems covering constitutional, Eye, HEENT, Psych, Respiratory, Cardiac, GI, , Musculoskeletal, Endocrine, Dermatologic was negative except for pertinent findings as listed ABOVE and below: pertinent positive as above, rest is good       Exam:Comprehensive exam done. /82 (BP Location: Left arm, Patient Position: Sitting, BP Method: Large (Automatic))   Pulse 89   Ht 5' 5" (1.651 m)   Wt 103.7 kg (228 lb 8.1 oz)   SpO2 98% Comment: On room air at rest.  BMI 38.03 kg/m²   Exam included Vitals as listed  Constitutional: She is oriented to person, place, and time. She appears well-developed. No distress.   Nose: Nose normal.   Mouth/Throat: Uvula is midline, oropharynx is clear and moist and mucous membranes are normal. No dental caries. No oropharyngeal exudate, posterior oropharyngeal edema, posterior oropharyngeal erythema or tonsillar abscesses.    Eyes: Pupils are equal, round, and reactive to light.   Neck: No JVD " present. No thyromegaly present.   Cardiovascular: Normal rate, regular rhythm and normal heart sounds. Exam reveals no gallop and no friction rub.   No murmur heard.  Pulmonary/Chest: Effort normal and breath sounds normal. No accessory muscle usage or stridor. No apnea and no tachypnea. No respiratory distress, decreased breath sounds, rhonchi, rales, or tenderness. Clear breath sounds throughout, on room air, in no acute distress.  Abdominal: Soft. He exhibits no mass. There is no tenderness. No hepatosplenomegaly, hernias and normoactive bowel sounds  Musculoskeletal: Normal range of motion. exhibits no edema.   Neurological:  alert and oriented to person, place, and time. not disoriented.   Skin: Skin is warm and dry. Capillary refill takes less 2 sec. No cyanosis or erythema. No pallor. Nails show no clubbing.   Psychiatric: normal mood and affect. behavior is normal. Judgment and thought content normal.       Radiographs (ct chest and cxr) reviewed: view by direct vision     CT Abdomen Pelvis With Contrast 7/28/2022 The lung bases are clear.       Labs reviewed    Lab Results   Component Value Date    WBC 6.39 07/27/2022    RBC 4.59 07/27/2022    HGB 12.6 07/27/2022    HCT 39.2 07/27/2022    MCV 85 07/27/2022    MCH 27.5 07/27/2022    MCHC 32.1 07/27/2022    RDW 13.9 07/27/2022     07/27/2022    MPV 10.2 07/27/2022       PFT reviewed.  Pulmonary Functions Testing Results:  12/2/2022  Normal spirometry.   Airflow is not clearly improved after bronchodilator. Clinical improvement following bronchodilator therapy may occur in the absence of spirometric improvement.   Mild restriction is noted on lung volume testing.   Mild reduction in diffusion capacity -unadjusted for hemoglobin.       Plan:  Clinical impression is resonably certain and repeated evaluation prn +/- follow up will be needed as below.    Karla was seen today for follow-up.    Diagnoses and all orders for this visit:    Severe persistent  asthma without complication    Other orders  -     predniSONE (DELTASONE) 20 MG tablet; Take one pill per day for three days only as needed for shortness of breath, cough, wheezing. Can repeat as needed.    Body mass index is 38.03 kg/m².   Morbid obesity complicates all aspects of disease management from diagnostic modalities to treatment. Weight loss encouraged and health benefits explained to patient. Nutritional counseling and physical activity encouraged.       Follow up in about 3 months (around 3/2/2023), or if symptoms worsen or fail to improve.    Discussed with patient above for education the following:      Patient Instructions   Continue current regiment.    Prednisone only as needed - take as prescribed.    Continue current medication regiment. Keep follow up appointment as scheduled. Please call the office if you have any questions or concerns.

## 2022-12-05 PROBLEM — J45.50 SEVERE PERSISTENT ASTHMA WITHOUT COMPLICATION: Status: ACTIVE | Noted: 2022-10-28

## 2022-12-05 RX ORDER — PREDNISONE 20 MG/1
TABLET ORAL
Qty: 15 TABLET | Refills: 0 | Status: SHIPPED | OUTPATIENT
Start: 2022-12-05

## 2022-12-05 NOTE — PATIENT INSTRUCTIONS
Continue current regiment.    Prednisone only as needed - take as prescribed.    Continue current medication regiment. Keep follow up appointment as scheduled. Please call the office if you have any questions or concerns.

## 2023-03-03 ENCOUNTER — OFFICE VISIT (OUTPATIENT)
Dept: PULMONOLOGY | Facility: CLINIC | Age: 68
End: 2023-03-03
Payer: MEDICARE

## 2023-03-03 VITALS
BODY MASS INDEX: 37.04 KG/M2 | WEIGHT: 222.31 LBS | OXYGEN SATURATION: 96 % | DIASTOLIC BLOOD PRESSURE: 78 MMHG | HEART RATE: 78 BPM | SYSTOLIC BLOOD PRESSURE: 122 MMHG | HEIGHT: 65 IN

## 2023-03-03 DIAGNOSIS — J45.50 SEVERE PERSISTENT ASTHMA WITHOUT COMPLICATION: Primary | ICD-10-CM

## 2023-03-03 DIAGNOSIS — E66.01 CLASS 2 SEVERE OBESITY DUE TO EXCESS CALORIES WITH SERIOUS COMORBIDITY AND BODY MASS INDEX (BMI) OF 37.0 TO 37.9 IN ADULT: ICD-10-CM

## 2023-03-03 DIAGNOSIS — G47.33 OSA (OBSTRUCTIVE SLEEP APNEA): ICD-10-CM

## 2023-03-03 PROBLEM — E66.812 CLASS 2 SEVERE OBESITY DUE TO EXCESS CALORIES WITH SERIOUS COMORBIDITY AND BODY MASS INDEX (BMI) OF 37.0 TO 37.9 IN ADULT: Status: ACTIVE | Noted: 2023-03-03

## 2023-03-03 PROCEDURE — 4010F PR ACE/ARB THEARPY RXD/TAKEN: ICD-10-PCS | Mod: CPTII,S$GLB,, | Performed by: NURSE PRACTITIONER

## 2023-03-03 PROCEDURE — 4010F ACE/ARB THERAPY RXD/TAKEN: CPT | Mod: CPTII,S$GLB,, | Performed by: NURSE PRACTITIONER

## 2023-03-03 PROCEDURE — 3008F BODY MASS INDEX DOCD: CPT | Mod: CPTII,S$GLB,, | Performed by: NURSE PRACTITIONER

## 2023-03-03 PROCEDURE — 1126F PR PAIN SEVERITY QUANTIFIED, NO PAIN PRESENT: ICD-10-PCS | Mod: CPTII,S$GLB,, | Performed by: NURSE PRACTITIONER

## 2023-03-03 PROCEDURE — 3078F DIAST BP <80 MM HG: CPT | Mod: CPTII,S$GLB,, | Performed by: NURSE PRACTITIONER

## 2023-03-03 PROCEDURE — 99214 OFFICE O/P EST MOD 30 MIN: CPT | Mod: S$GLB,,, | Performed by: NURSE PRACTITIONER

## 2023-03-03 PROCEDURE — 99999 PR PBB SHADOW E&M-EST. PATIENT-LVL IV: CPT | Mod: PBBFAC,,, | Performed by: NURSE PRACTITIONER

## 2023-03-03 PROCEDURE — 1101F PR PT FALLS ASSESS DOC 0-1 FALLS W/OUT INJ PAST YR: ICD-10-PCS | Mod: CPTII,S$GLB,, | Performed by: NURSE PRACTITIONER

## 2023-03-03 PROCEDURE — 3078F PR MOST RECENT DIASTOLIC BLOOD PRESSURE < 80 MM HG: ICD-10-PCS | Mod: CPTII,S$GLB,, | Performed by: NURSE PRACTITIONER

## 2023-03-03 PROCEDURE — 3008F PR BODY MASS INDEX (BMI) DOCUMENTED: ICD-10-PCS | Mod: CPTII,S$GLB,, | Performed by: NURSE PRACTITIONER

## 2023-03-03 PROCEDURE — 1126F AMNT PAIN NOTED NONE PRSNT: CPT | Mod: CPTII,S$GLB,, | Performed by: NURSE PRACTITIONER

## 2023-03-03 PROCEDURE — 1159F PR MEDICATION LIST DOCUMENTED IN MEDICAL RECORD: ICD-10-PCS | Mod: CPTII,S$GLB,, | Performed by: NURSE PRACTITIONER

## 2023-03-03 PROCEDURE — 1101F PT FALLS ASSESS-DOCD LE1/YR: CPT | Mod: CPTII,S$GLB,, | Performed by: NURSE PRACTITIONER

## 2023-03-03 PROCEDURE — 3288F PR FALLS RISK ASSESSMENT DOCUMENTED: ICD-10-PCS | Mod: CPTII,S$GLB,, | Performed by: NURSE PRACTITIONER

## 2023-03-03 PROCEDURE — 99999 PR PBB SHADOW E&M-EST. PATIENT-LVL IV: ICD-10-PCS | Mod: PBBFAC,,, | Performed by: NURSE PRACTITIONER

## 2023-03-03 PROCEDURE — 3074F SYST BP LT 130 MM HG: CPT | Mod: CPTII,S$GLB,, | Performed by: NURSE PRACTITIONER

## 2023-03-03 PROCEDURE — 3288F FALL RISK ASSESSMENT DOCD: CPT | Mod: CPTII,S$GLB,, | Performed by: NURSE PRACTITIONER

## 2023-03-03 PROCEDURE — 99214 PR OFFICE/OUTPT VISIT, EST, LEVL IV, 30-39 MIN: ICD-10-PCS | Mod: S$GLB,,, | Performed by: NURSE PRACTITIONER

## 2023-03-03 PROCEDURE — 3074F PR MOST RECENT SYSTOLIC BLOOD PRESSURE < 130 MM HG: ICD-10-PCS | Mod: CPTII,S$GLB,, | Performed by: NURSE PRACTITIONER

## 2023-03-03 PROCEDURE — 1159F MED LIST DOCD IN RCRD: CPT | Mod: CPTII,S$GLB,, | Performed by: NURSE PRACTITIONER

## 2023-03-03 NOTE — PATIENT INSTRUCTIONS
Continue current asthma regiment including Trelegy once per day and Albuterol as needed for shortness of breath, wheezing, cough.    I have ordered an at home sleep study to evaluate for sleep apnea. If test is positive, I will order a CPAP machine. Please notify my clinic when you receive the machine to set up a 31 day compliance appointment. You must use the machine for a least 4 hours every night to avoid insurance denial.     Keep Prednisone as needed - use sparingly.     Continue current medication regiment. Keep follow up appointment as scheduled. Please call the office if you have any questions or concerns.

## 2023-03-03 NOTE — PROGRESS NOTES
3/3/2023    Karla Tejeda  In office visit    Chief Complaint   Patient presents with    3 month f/u       HPI:  03/03/2023:  Recently went to [place] - [facility] tolerated trip well! Did not have to use Prednisone while on trip.  Upcoming trip to [place].  Using Trelegy once per day and Albuterol as needed.  Endorses frequent nocturnal arousals, snoring, gasping for air while asleep. Denies daytime fatigue, morning headaches, depression.  EPWORTH SLEEPINESS SCALE 03/03/2023: 18    12/2/2022:  Recent cruise to [place] - [facility] had a great time.  Using nebulizer machine as needed - approx 2x per day with great benefit.  Using Trelegy once per day with great benefit.   Completed two Prednisone regiments since prior visit with great benefit.  Flying out to [place] in January 2023.   Patient Instructions   Continue current regiment.  Prednisone only as needed - take as prescribed.  Continue current medication regiment. Keep follow up appointment as scheduled. Please call the office if you have any questions or concerns.           10/28/2022:  Recently moved back to [place] from [place].   In office today alone - [facility] seen by Pulmonology approx 30 years ago - diagnosed with asthma.  Shortness of breath: Worsening over the last six months, associated with wheezing, chest tightness. Occurs with conversation, and exertion. Affecting ADLS. Can't wash the dishes without stopping to catch her breath. States breathing gets better with sleeping more upright than flat.   Cough: Nonproductive, no time correlation identified, associated with nocturnal arousals.   Not currently on maintenance inhaler - using albuterol inhaler approx 10x per day.   States underwent sleep study in the past, was told she does not have sleep apnea.   Endorses daytime fatigue, nocturnal arousals, morning headaches.   Patient Instructions   Trelegy is your new maintenance inhaler, This is ONE PUFF ONCE PER DAY.  This inhaler contains an inhaled steroid component.  "Rinse mouth after each use due to risk for thrush development. If mouth or tongue develops white sores please contact the clinic and I will order a prescription mouth wash.   Continue Albuterol inhaler as needed for shortness of breath, wheezing, cough.  May high Eosinophilic asthma? Can check CBC in the future.  Prednisone - take the 9 day regiment as prescribed. Bring the other regiment with you on your cruise.  Nebulized treatments, use as needed.  I ordered a Lung Function Test to evaluate lung strength. Please complete prior to next appointment.   Continue current medication regiment. Keep follow up appointment as scheduled. Please call the office if you have any questions or concerns.         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 No prior chest surgery.        The chief compliant  problem varies with instability at times.  PFSH:  Past Medical History:   Diagnosis Date    Carpal tunnel syndrome     Colon polyp     Diverticulitis     Diverticulosis     Encounter for blood transfusion     Esophageal ulcer     GERD (gastroesophageal reflux disease)     Hiatal hernia     Hypertension     Mild intermittent asthma, uncomplicated          Past Surgical History:   Procedure Laterality Date    ABDOMINAL SURGERY      bladder lift    APPENDECTOMY  ~2000    CARPAL TUNNEL RELEASE      [facility]    CHOLECYSTECTOMY  ~2000    COLONOSCOPY  05/16/2018    Dr. Ennis, sent for scanning: hemorrhoids, diverticulosis, 4 colon polyps removed    COLONOSCOPY  12/12/2012    Dr. Ennis, sent for scanning: hemorrhoids, diverticulosis, 3 colon polyps removed    COLONOSCOPY  09/13/2021    Dr. Ennis, sent for scanning: hemorrhoids, "a few 2 to 4 mm polyps in the rectum, removed " "with a jumbo cold forceps", diverticulosis, repeat in 5 years for surveillance    COLONOSCOPY N/A 9/7/2022    Procedure: COLONOSCOPY;  Surgeon: Migel Rivera MD;  Location: [facility] ENDO;  Service: Endoscopy;  Laterality: N/A;    cystomoty      ESOPHAGOGASTRODUODENOSCOPY  05/16/2018    Dr. Ennis, sent for scanning: mild non-erosive gastritis, esophageal ulcer & erosion; mild esophagitis; hiatal hernia    ESOPHAGOGASTRODUODENOSCOPY  10/31/2018    Dr. Ennis, sent for scanning: small hiatal hernia    ESOPHAGOGASTRODUODENOSCOPY N/A 8/23/2022    Procedure: EGD (ESOPHAGOGASTRODUODENOSCOPY);  Surgeon: Migel Rivera MD;  Location: [facility] ENDO;  Service: Endoscopy;  Laterality: N/A;    HERNIA MESH REMOVAL N/A     HYSTERECTOMY      KNEE SURGERY      BL TKA    SHOULDER SURGERY      TSA right     Social History     Tobacco Use    Smoking status: Never    Smokeless tobacco: Never   Substance Use Topics    Alcohol use: Never    Drug use: Never     Family History   Problem Relation Age of Onset    Prostate cancer Brother     Colon cancer Neg Hx     Stomach cancer Neg Hx     Esophageal cancer Neg Hx     Crohn's disease Neg Hx     Ulcerative colitis Neg Hx     Celiac disease Neg Hx      Review of patient's allergies indicates:   Allergen Reactions    Penicillins      Other reaction(s): Unknown  Other reaction(s): Angioedema    Codeine Hives     Other reaction(s): Unknown     I have reviewed past medical, family, and social history. I have reviewed previous nurse notes.    Performance Status:The patient's activity level is functions out of house    Review of Systems:  a review of eleven systems covering constitutional, Eye, HEENT, Psych, Respiratory, Cardiac, GI, , Musculoskeletal, Endocrine, Dermatologic was negative except for pertinent findings as listed ABOVE and below: pertinent positive as above, rest is good       Exam:Comprehensive exam done. /78 (BP Location: Left arm, Patient Position: Sitting, BP Method: " "Large (Automatic))   Pulse 78   Ht 5' 5" (1.651 m)   Wt 100.8 kg (222 lb 5.3 oz)   SpO2 96% Comment: room air at rest  BMI 37.00 kg/m²   Exam included Vitals as listed  Constitutional: She is oriented to person, place, and time. She appears well-developed. No distress.   Nose: Nose normal.   Mouth/Throat: Uvula is midline, oropharynx is clear and moist and mucous membranes are normal. No dental caries. No oropharyngeal exudate, posterior oropharyngeal edema, posterior oropharyngeal erythema or tonsillar abscesses.    Eyes: Pupils are equal, round, and reactive to light.   Neck: No JVD present. No thyromegaly present.   Cardiovascular: Normal rate, regular rhythm and normal heart sounds. Exam reveals no gallop and no friction rub.   No murmur heard.  Pulmonary/Chest: Effort normal and breath sounds normal. No accessory muscle usage or stridor. No apnea and no tachypnea. No respiratory distress, decreased breath sounds, rhonchi, rales, or tenderness. Clear breath sounds throughout, on room air, in no acute distress.  Abdominal: Soft. He exhibits no mass. There is no tenderness. No hepatosplenomegaly, hernias and normoactive bowel sounds  Musculoskeletal: Normal range of motion. exhibits no edema.   Neurological:  alert and oriented to person, place, and time. not disoriented.   Skin: Skin is warm and dry. Capillary refill takes less 2 sec. No cyanosis or erythema. No pallor. Nails show no clubbing.   Psychiatric: normal mood and affect. behavior is normal. Judgment and thought content normal.       Radiographs (ct chest and cxr) reviewed: view by direct vision     CT Abdomen Pelvis With Contrast 7/28/2022 The lung bases are clear.       Labs reviewed    Lab Results   Component Value Date    WBC 6.39 07/27/2022    RBC 4.59 07/27/2022    HGB 12.6 07/27/2022    HCT 39.2 07/27/2022    MCV 85 07/27/2022    MCH 27.5 07/27/2022    MCHC 32.1 07/27/2022    RDW 13.9 07/27/2022     07/27/2022    MPV 10.2 07/27/2022 "       PFT reviewed.  Pulmonary Functions Testing Results:  12/2/2022  Normal spirometry.   Airflow is not clearly improved after bronchodilator. Clinical improvement following bronchodilator therapy may occur in the absence of spirometric improvement.   Mild restriction is noted on lung volume testing.   Mild reduction in diffusion capacity -unadjusted for hemoglobin.       Plan:  Clinical impression is resonably certain and repeated evaluation prn +/- follow up will be needed as below.    Karla was seen today for 3 month f/u.    Diagnoses and all orders for this visit:    Severe persistent asthma without complication    EUFEMIA (obstructive sleep apnea)    Class 2 severe obesity due to excess calories with serious comorbidity and body mass index (BMI) of 37.0 to 37.9 in adult        Body mass index is 37 kg/m².     Morbid obesity complicates all aspects of disease management from diagnostic modalities to treatment. Weight loss encouraged and health benefits explained to patient. Nutritional counseling and physical activity encouraged.       Follow up in about 3 months (around 6/3/2023), or if symptoms worsen or fail to improve.    Discussed with patient above for education the following:      Patient Instructions   Continue current asthma regiment including Trelegy once per day and Albuterol as needed for shortness of breath, wheezing, cough.    I have ordered an at home sleep study to evaluate for sleep apnea. If test is positive, I will order a CPAP machine. Please notify my clinic when you receive the machine to set up a 31 day compliance appointment. You must use the machine for a least 4 hours every night to avoid insurance denial.     Keep Prednisone as needed - use sparingly.     Continue current medication regiment. Keep follow up appointment as scheduled. Please call the office if you have any questions or concerns.

## 2023-05-23 ENCOUNTER — HOSPITAL ENCOUNTER (OUTPATIENT)
Dept: RADIOLOGY | Facility: HOSPITAL | Age: 68
Discharge: HOME | End: 2023-05-23
Attending: NURSE PRACTITIONER
Payer: MEDICARE

## 2023-05-23 DIAGNOSIS — R22.1 NECK MASS: ICD-10-CM

## 2023-05-23 DIAGNOSIS — R22.1 NECK MASS: Primary | ICD-10-CM

## 2023-05-23 PROCEDURE — 76536 US EXAM OF HEAD AND NECK: CPT | Mod: TC

## 2023-09-06 ENCOUNTER — OFFICE VISIT (OUTPATIENT)
Dept: PULMONOLOGY | Facility: CLINIC | Age: 68
End: 2023-09-06
Payer: MEDICARE

## 2023-09-06 VITALS
BODY MASS INDEX: 33.44 KG/M2 | HEART RATE: 73 BPM | SYSTOLIC BLOOD PRESSURE: 113 MMHG | DIASTOLIC BLOOD PRESSURE: 75 MMHG | WEIGHT: 200.94 LBS | OXYGEN SATURATION: 97 %

## 2023-09-06 DIAGNOSIS — E66.09 CLASS 1 OBESITY DUE TO EXCESS CALORIES WITH SERIOUS COMORBIDITY AND BODY MASS INDEX (BMI) OF 33.0 TO 33.9 IN ADULT: ICD-10-CM

## 2023-09-06 DIAGNOSIS — J45.50 SEVERE PERSISTENT ASTHMA WITHOUT COMPLICATION: ICD-10-CM

## 2023-09-06 DIAGNOSIS — G47.30 SLEEP APNEA, UNSPECIFIED TYPE: Primary | ICD-10-CM

## 2023-09-06 PROBLEM — E66.811 CLASS 1 OBESITY DUE TO EXCESS CALORIES WITH SERIOUS COMORBIDITY AND BODY MASS INDEX (BMI) OF 33.0 TO 33.9 IN ADULT: Status: ACTIVE | Noted: 2023-03-03

## 2023-09-06 PROCEDURE — 3288F FALL RISK ASSESSMENT DOCD: CPT | Mod: CPTII,S$GLB,, | Performed by: NURSE PRACTITIONER

## 2023-09-06 PROCEDURE — 3008F BODY MASS INDEX DOCD: CPT | Mod: CPTII,S$GLB,, | Performed by: NURSE PRACTITIONER

## 2023-09-06 PROCEDURE — 99999 PR PBB SHADOW E&M-EST. PATIENT-LVL IV: CPT | Mod: PBBFAC,,, | Performed by: NURSE PRACTITIONER

## 2023-09-06 PROCEDURE — 1101F PT FALLS ASSESS-DOCD LE1/YR: CPT | Mod: CPTII,S$GLB,, | Performed by: NURSE PRACTITIONER

## 2023-09-06 PROCEDURE — 99213 PR OFFICE/OUTPT VISIT, EST, LEVL III, 20-29 MIN: ICD-10-PCS | Mod: S$GLB,,, | Performed by: NURSE PRACTITIONER

## 2023-09-06 PROCEDURE — 3008F PR BODY MASS INDEX (BMI) DOCUMENTED: ICD-10-PCS | Mod: CPTII,S$GLB,, | Performed by: NURSE PRACTITIONER

## 2023-09-06 PROCEDURE — 3074F SYST BP LT 130 MM HG: CPT | Mod: CPTII,S$GLB,, | Performed by: NURSE PRACTITIONER

## 2023-09-06 PROCEDURE — 1126F AMNT PAIN NOTED NONE PRSNT: CPT | Mod: CPTII,S$GLB,, | Performed by: NURSE PRACTITIONER

## 2023-09-06 PROCEDURE — 4010F PR ACE/ARB THEARPY RXD/TAKEN: ICD-10-PCS | Mod: CPTII,S$GLB,, | Performed by: NURSE PRACTITIONER

## 2023-09-06 PROCEDURE — 99213 OFFICE O/P EST LOW 20 MIN: CPT | Mod: S$GLB,,, | Performed by: NURSE PRACTITIONER

## 2023-09-06 PROCEDURE — 1159F MED LIST DOCD IN RCRD: CPT | Mod: CPTII,S$GLB,, | Performed by: NURSE PRACTITIONER

## 2023-09-06 PROCEDURE — 3074F PR MOST RECENT SYSTOLIC BLOOD PRESSURE < 130 MM HG: ICD-10-PCS | Mod: CPTII,S$GLB,, | Performed by: NURSE PRACTITIONER

## 2023-09-06 PROCEDURE — 3288F PR FALLS RISK ASSESSMENT DOCUMENTED: ICD-10-PCS | Mod: CPTII,S$GLB,, | Performed by: NURSE PRACTITIONER

## 2023-09-06 PROCEDURE — 4010F ACE/ARB THERAPY RXD/TAKEN: CPT | Mod: CPTII,S$GLB,, | Performed by: NURSE PRACTITIONER

## 2023-09-06 PROCEDURE — 3078F DIAST BP <80 MM HG: CPT | Mod: CPTII,S$GLB,, | Performed by: NURSE PRACTITIONER

## 2023-09-06 PROCEDURE — 1101F PR PT FALLS ASSESS DOC 0-1 FALLS W/OUT INJ PAST YR: ICD-10-PCS | Mod: CPTII,S$GLB,, | Performed by: NURSE PRACTITIONER

## 2023-09-06 PROCEDURE — 99999 PR PBB SHADOW E&M-EST. PATIENT-LVL IV: ICD-10-PCS | Mod: PBBFAC,,, | Performed by: NURSE PRACTITIONER

## 2023-09-06 PROCEDURE — 3078F PR MOST RECENT DIASTOLIC BLOOD PRESSURE < 80 MM HG: ICD-10-PCS | Mod: CPTII,S$GLB,, | Performed by: NURSE PRACTITIONER

## 2023-09-06 PROCEDURE — 1159F PR MEDICATION LIST DOCUMENTED IN MEDICAL RECORD: ICD-10-PCS | Mod: CPTII,S$GLB,, | Performed by: NURSE PRACTITIONER

## 2023-09-06 PROCEDURE — 1126F PR PAIN SEVERITY QUANTIFIED, NO PAIN PRESENT: ICD-10-PCS | Mod: CPTII,S$GLB,, | Performed by: NURSE PRACTITIONER

## 2023-09-06 NOTE — PROGRESS NOTES
9/6/2023    Karla Tejeda  In office visit    Chief Complaint   Patient presents with    Follow-up     6 month f/u       HPI:  09/06/2023:  Overall doing well! Recently turned 68 years old. No respiratory complaints.   Using Trelegy once per day and Albuterol as needed - hasn't required Albuterol.   Did not undergo sleep study - interested in pursuing at this time.        03/03/2023:  Recently went to [facility] tolerated trip well! Did not have to use Prednisone while on trip.  Upcoming trip to [place].  Using Trelegy once per day and Albuterol as needed.  Endorses frequent nocturnal arousals, snoring, gasping for air while asleep. Denies daytime fatigue, morning headaches, depression.  EPWORTH SLEEPINESS SCALE 03/03/2023: 18  Patient Instructions   Continue current asthma regiment including Trelegy once per day and Albuterol as needed for shortness of breath, wheezing, cough.  I have ordered an at home sleep study to evaluate for sleep apnea. If test is positive, I will order a CPAP machine. Please notify my clinic when you receive the machine to set up a 31 day compliance appointment. You must use the machine for a least 4 hours every night to avoid insurance denial.   Keep Prednisone as needed - use sparingly.   Continue current medication regiment. Keep follow up appointment as scheduled. Please call the office if you have any questions or concerns.       12/2/2022:  Recent cruise to [facility] had a great time.  Using nebulizer machine as needed - approx 2x per day with great benefit.  Using Trelegy once per day with great benefit.   Completed two Prednisone regiments since prior visit with great benefit.  Flying out to [place] in January 2023.   Patient Instructions   Continue current regiment.  Prednisone only as needed - take as prescribed.  Continue current medication regiment. Keep follow up appointment as scheduled. Please call the office if you have any questions or concerns.            10/28/2022:  Recently moved back to [place] from [place].   In office today alone - states seen by Pulmonology approx 30 years ago - diagnosed with asthma.  Shortness of breath: Worsening over the last six months, associated with wheezing, chest tightness. Occurs with conversation, and exertion. Affecting ADLS. Can't wash the dishes without stopping to catch her breath. States breathing gets better with sleeping more upright than flat.   Cough: Nonproductive, no time correlation identified, associated with nocturnal arousals.   Not currently on maintenance inhaler - using albuterol inhaler approx 10x per day.   States underwent sleep study in the past, was told she does not have sleep apnea.   Endorses daytime fatigue, nocturnal arousals, morning headaches.   Patient Instructions   Trelegy is your new maintenance inhaler, This is ONE PUFF ONCE PER DAY.  This inhaler contains an inhaled steroid component. Rinse mouth after each use due to risk for thrush development. If mouth or tongue develops white sores please contact the clinic and I will order a prescription mouth wash.   Continue Albuterol inhaler as needed for shortness of breath, wheezing, cough.  May high Eosinophilic asthma? Can check CBC in the future.  Prednisone - take the 9 day regiment as prescribed. Bring the other regiment with you on your cruise.  Nebulized treatments, use as needed.  I ordered a Lung Function Test to evaluate lung strength. Please complete prior to next appointment.   Continue current medication regiment. Keep follow up appointment as scheduled. Please call the office if you have any questions or concerns.         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 No prior chest  "surgery.        The chief compliant  problem varies with instability at times.  PFSH:  Past Medical History:   Diagnosis Date    Carpal tunnel syndrome     Colon polyp     Diverticulitis     Diverticulosis     Encounter for blood transfusion     Esophageal ulcer     GERD (gastroesophageal reflux disease)     Hiatal hernia     Hypertension     Mild intermittent asthma, uncomplicated          Past Surgical History:   Procedure Laterality Date    ABDOMINAL SURGERY      bladder lift    APPENDECTOMY  ~2000    CARPAL TUNNEL RELEASE      [facility]    CHOLECYSTECTOMY  ~2000    COLONOSCOPY  05/16/2018    Dr. Ennis, sent for scanning: hemorrhoids, diverticulosis, 4 colon polyps removed    COLONOSCOPY  12/12/2012    Dr. Ennis, sent for scanning: hemorrhoids, diverticulosis, 3 colon polyps removed    COLONOSCOPY  09/13/2021    Dr. Ennis, sent for scanning: hemorrhoids, "a few 2 to 4 mm polyps in the rectum, removed with a jumbo cold forceps", diverticulosis, repeat in 5 years for surveillance    COLONOSCOPY N/A 9/7/2022    Procedure: COLONOSCOPY;  Surgeon: Migel Rivera MD;  Location: [facility];  Service: Endoscopy;  Laterality: N/A;    cystomoty      ESOPHAGOGASTRODUODENOSCOPY  05/16/2018    Dr. Ennis, sent for scanning: mild non-erosive gastritis, esophageal ulcer & erosion; mild esophagitis; hiatal hernia    ESOPHAGOGASTRODUODENOSCOPY  10/31/2018    Dr. Ennis, sent for scanning: small hiatal hernia    ESOPHAGOGASTRODUODENOSCOPY N/A 8/23/2022    Procedure: EGD (ESOPHAGOGASTRODUODENOSCOPY);  Surgeon: Migel Rivera MD;  Location: [facility] ENDO;  Service: Endoscopy;  Laterality: N/A;    HERNIA MESH REMOVAL N/A     HYSTERECTOMY      KNEE SURGERY      BL TKA    SHOULDER SURGERY      TSA right     Social History     Tobacco Use    Smoking status: Never    Smokeless tobacco: Never   Substance Use Topics    Alcohol use: Never    Drug use: Never     Family History   Problem Relation Age of Onset    Prostate cancer " Brother     Colon cancer Neg Hx     Stomach cancer Neg Hx     Esophageal cancer Neg Hx     Crohn's disease Neg Hx     Ulcerative colitis Neg Hx     Celiac disease Neg Hx      Review of patient's allergies indicates:   Allergen Reactions    Penicillins      Other reaction(s): Unknown  Other reaction(s): Angioedema    Codeine Hives     Other reaction(s): Unknown     I have reviewed past medical, family, and social history. I have reviewed previous nurse notes.    Performance Status:The patient's activity level is functions out of house    Review of Systems:  a review of eleven systems covering constitutional, Eye, HEENT, Psych, Respiratory, Cardiac, GI, , Musculoskeletal, Endocrine, Dermatologic was negative except for pertinent findings as listed ABOVE and below: pertinent positive as above, rest is good       Exam:Comprehensive exam done. /75   Pulse 73   Wt 91.2 kg (200 lb 15.2 oz)   SpO2 97%   BMI 33.44 kg/m²   Exam included Vitals as listed  Constitutional: She is oriented to person, place, and time. She appears well-developed. No distress.   Nose: Nose normal.   Mouth/Throat: Uvula is midline, oropharynx is clear and moist and mucous membranes are normal. No dental caries. No oropharyngeal exudate, posterior oropharyngeal edema, posterior oropharyngeal erythema or tonsillar abscesses.    Eyes: Pupils are equal, round, and reactive to light.   Neck: No JVD present. No thyromegaly present.   Cardiovascular: Normal rate, regular rhythm and normal heart sounds. Exam reveals no gallop and no friction rub.   No murmur heard.  Pulmonary/Chest: Effort normal and breath sounds normal. No accessory muscle usage or stridor. No apnea and no tachypnea. No respiratory distress, decreased breath sounds, rhonchi, rales, or tenderness. Clear breath sounds throughout, on room air, in no acute distress.  Abdominal: Soft. He exhibits no mass. There is no tenderness. No hepatosplenomegaly, hernias and normoactive bowel  sounds  Musculoskeletal: Normal range of motion. exhibits no edema.   Neurological:  alert and oriented to person, place, and time. not disoriented.   Skin: Skin is warm and dry. Capillary refill takes less 2 sec. No cyanosis or erythema. No pallor. Nails show no clubbing.   Psychiatric: normal mood and affect. behavior is normal. Judgment and thought content normal.       Radiographs (ct chest and cxr) reviewed: view by direct vision     CT Abdomen Pelvis With Contrast 7/28/2022 The lung bases are clear.       Labs reviewed    Lab Results   Component Value Date    WBC 6.39 07/27/2022    RBC 4.59 07/27/2022    HGB 12.6 07/27/2022    HCT 39.2 07/27/2022    MCV 85 07/27/2022    MCH 27.5 07/27/2022    MCHC 32.1 07/27/2022    RDW 13.9 07/27/2022     07/27/2022    MPV 10.2 07/27/2022   CMP  Creatinine   Date Value Ref Range Status   07/27/2022 0.8 0.5 - 1.4 mg/dL Final         PFT reviewed.  Pulmonary Functions Testing Results:  12/2/2022  Normal spirometry.   Airflow is not clearly improved after bronchodilator. Clinical improvement following bronchodilator therapy may occur in the absence of spirometric improvement.   Mild restriction is noted on lung volume testing.   Mild reduction in diffusion capacity -unadjusted for hemoglobin.       Plan:  Clinical impression is resonably certain and repeated evaluation prn +/- follow up will be needed as below.    Karla was seen today for follow-up.    Diagnoses and all orders for this visit:    Sleep apnea, unspecified type    Severe persistent asthma without complication    Class 1 obesity due to excess calories with serious comorbidity and body mass index (BMI) of 33.0 to 33.9 in adult          Body mass index is 33.44 kg/m².     Morbid obesity complicates all aspects of disease management from diagnostic modalities to treatment. Weight loss encouraged and health benefits explained to patient. Nutritional counseling and physical activity encouraged.       Follow up in  about 6 months (around 3/6/2024), or if symptoms worsen or fail to improve.    Discussed with patient above for education the following:      Patient Instructions   Will proceed with at home sleep study.    Continue current Trelegy regimen once per day.    Take Prednisone on your trips.    Continue current prescription medication regiment. Keep follow up appointment as scheduled. Please call the office if you have any questions or concerns.

## 2023-09-06 NOTE — PATIENT INSTRUCTIONS
Will proceed with at home sleep study.    Continue current Trelegy regimen once per day.    Take Prednisone on your trips.    Continue current prescription medication regiment. Keep follow up appointment as scheduled. Please call the office if you have any questions or concerns.

## 2023-09-27 DIAGNOSIS — Z12.31 BREAST CANCER SCREENING BY MAMMOGRAM: Primary | ICD-10-CM

## 2023-12-22 ENCOUNTER — TELEPHONE (OUTPATIENT)
Dept: OTOLARYNGOLOGY | Facility: CLINIC | Age: 68
End: 2023-12-22
Payer: MEDICARE

## 2023-12-22 NOTE — TELEPHONE ENCOUNTER
----- Message from Jud Curry sent at 12/22/2023  2:16 PM CST -----  Contact: pt  Type:  Sooner Appointment Request    Caller is requesting a sooner appointment.  Caller declined first available appointment listed below.  Caller will not accept being placed on the waitlist and is requesting a message be sent to doctor.    Name of Caller:  pt  When is the first available appointment?  N/a  Symptoms:  ear wax removal  Would the patient rather a call back or a response via [facility]? call  Best Call Back Number:  [phone]   Additional Information:  Thanks

## 2023-12-22 NOTE — TELEPHONE ENCOUNTER
Contacted patient, informed her of the message below, advised that she reaches out to PCP and be referred to another ENT in private practice. Pt understood and will follow up with them instead.

## 2024-02-09 ENCOUNTER — TELEPHONE (OUTPATIENT)
Dept: PULMONOLOGY | Facility: CLINIC | Age: 69
End: 2024-02-09
Payer: MEDICARE

## 2024-02-09 NOTE — TELEPHONE ENCOUNTER
----- Message from Genie Heredia sent at 2/9/2024  4:19 PM CST -----  Regarding: rt call  Type:  Patient Returning Call    Who Called:pt    Who Left Message for Patient:LISA PRYOR    Does the patient know what this is regarding?:yes    Best Call Back Number:[phone]      Additional Information:

## 2024-02-09 NOTE — TELEPHONE ENCOUNTER
Was unable to reach Pt by phone to get her rescheduled for her 3/6 appointment with NP. Tammy Lyles. LMOM.

## 2024-02-28 ENCOUNTER — TELEPHONE (OUTPATIENT)
Dept: ORTHOPEDICS | Facility: CLINIC | Age: 69
End: 2024-02-28
Payer: MEDICARE

## 2024-02-29 ENCOUNTER — TELEPHONE (OUTPATIENT)
Dept: ORTHOPEDICS | Facility: CLINIC | Age: 69
End: 2024-02-29
Payer: MEDICARE

## 2024-02-29 DIAGNOSIS — M25.551 BILATERAL HIP PAIN: Primary | ICD-10-CM

## 2024-02-29 DIAGNOSIS — M25.552 BILATERAL HIP PAIN: Primary | ICD-10-CM

## 2024-02-29 NOTE — TELEPHONE ENCOUNTER
Left message requesting pt heydi back to cancel appointment due to pt insurance. Her insurance provider does not contract with [facility].

## 2024-04-07 ENCOUNTER — HOSPITAL ENCOUNTER (EMERGENCY)
Facility: HOSPITAL | Age: 69
Discharge: LEFT BEFORE DISCHARGE | End: 2024-04-08
Attending: EMERGENCY MEDICINE
Payer: MEDICARE

## 2024-04-07 DIAGNOSIS — I10 HYPERTENSION, UNSPECIFIED TYPE: Primary | ICD-10-CM

## 2024-04-07 DIAGNOSIS — R51.9 HEADACHE: ICD-10-CM

## 2024-04-07 PROCEDURE — 99285 EMERGENCY DEPT VISIT HI MDM: CPT

## 2024-04-08 VITALS
WEIGHT: 207 LBS | DIASTOLIC BLOOD PRESSURE: 85 MMHG | RESPIRATION RATE: 16 BRPM | OXYGEN SATURATION: 99 % | TEMPERATURE: 98 F | BODY MASS INDEX: 34.45 KG/M2 | HEART RATE: 60 BPM | SYSTOLIC BLOOD PRESSURE: 147 MMHG

## 2024-04-08 LAB
ALBUMIN SERPL BCP-MCNC: 4.3 G/DL (ref 3.5–5.2)
ALP SERPL-CCNC: 44 U/L (ref 55–135)
ALT SERPL W/O P-5'-P-CCNC: 27 U/L (ref 10–44)
ANION GAP SERPL CALC-SCNC: 6 MMOL/L (ref 8–16)
AST SERPL-CCNC: 24 U/L (ref 10–40)
BASOPHILS # BLD AUTO: 0.05 K/UL (ref 0–0.2)
BASOPHILS NFR BLD: 0.7 % (ref 0–1.9)
BILIRUB SERPL-MCNC: 0.4 MG/DL (ref 0.1–1)
BILIRUB UR QL STRIP: NEGATIVE
BUN SERPL-MCNC: 14 MG/DL (ref 8–23)
CALCIUM SERPL-MCNC: 9.9 MG/DL (ref 8.7–10.5)
CAOX CRY URNS QL MICRO: ABNORMAL
CHLORIDE SERPL-SCNC: 102 MMOL/L (ref 95–110)
CLARITY UR: CLEAR
CO2 SERPL-SCNC: 30 MMOL/L (ref 23–29)
COLOR UR: YELLOW
CREAT SERPL-MCNC: 0.7 MG/DL (ref 0.5–1.4)
DIFFERENTIAL METHOD BLD: ABNORMAL
EOSINOPHIL # BLD AUTO: 0.4 K/UL (ref 0–0.5)
EOSINOPHIL NFR BLD: 5.1 % (ref 0–8)
ERYTHROCYTE [DISTWIDTH] IN BLOOD BY AUTOMATED COUNT: 13.5 % (ref 11.5–14.5)
EST. GFR  (NO RACE VARIABLE): >60 ML/MIN/1.73 M^2
GLUCOSE SERPL-MCNC: 106 MG/DL (ref 70–110)
GLUCOSE UR QL STRIP: NEGATIVE
HCT VFR BLD AUTO: 36.9 % (ref 37–48.5)
HGB BLD-MCNC: 11.8 G/DL (ref 12–16)
HGB UR QL STRIP: NEGATIVE
HYALINE CASTS #/AREA URNS LPF: 4 /LPF
IMM GRANULOCYTES # BLD AUTO: 0.01 K/UL (ref 0–0.04)
IMM GRANULOCYTES NFR BLD AUTO: 0.1 % (ref 0–0.5)
KETONES UR QL STRIP: NEGATIVE
LEUKOCYTE ESTERASE UR QL STRIP: ABNORMAL
LYMPHOCYTES # BLD AUTO: 4.1 K/UL (ref 1–4.8)
LYMPHOCYTES NFR BLD: 58.2 % (ref 18–48)
MCH RBC QN AUTO: 27.1 PG (ref 27–31)
MCHC RBC AUTO-ENTMCNC: 32 G/DL (ref 32–36)
MCV RBC AUTO: 85 FL (ref 82–98)
MICROSCOPIC COMMENT: ABNORMAL
MONOCYTES # BLD AUTO: 0.5 K/UL (ref 0.3–1)
MONOCYTES NFR BLD: 7.5 % (ref 4–15)
NEUTROPHILS # BLD AUTO: 2 K/UL (ref 1.8–7.7)
NEUTROPHILS NFR BLD: 28.4 % (ref 38–73)
NITRITE UR QL STRIP: NEGATIVE
NRBC BLD-RTO: 0 /100 WBC
PH UR STRIP: 6 [PH] (ref 5–8)
PLATELET # BLD AUTO: 247 K/UL (ref 150–450)
PMV BLD AUTO: 10.3 FL (ref 9.2–12.9)
POTASSIUM SERPL-SCNC: 4.3 MMOL/L (ref 3.5–5.1)
PROT SERPL-MCNC: 7.5 G/DL (ref 6–8.4)
PROT UR QL STRIP: ABNORMAL
RBC # BLD AUTO: 4.36 M/UL (ref 4–5.4)
RBC #/AREA URNS HPF: 2 /HPF (ref 0–4)
SODIUM SERPL-SCNC: 138 MMOL/L (ref 136–145)
SP GR UR STRIP: 1.02 (ref 1–1.03)
SQUAMOUS #/AREA URNS HPF: 1 /HPF
URN SPEC COLLECT METH UR: ABNORMAL
UROBILINOGEN UR STRIP-ACNC: NEGATIVE EU/DL
WBC # BLD AUTO: 7.04 K/UL (ref 3.9–12.7)
WBC #/AREA URNS HPF: 10 /HPF (ref 0–5)

## 2024-04-08 PROCEDURE — 25000003 PHARM REV CODE 250: Performed by: EMERGENCY MEDICINE

## 2024-04-08 PROCEDURE — 25500020 PHARM REV CODE 255: Performed by: EMERGENCY MEDICINE

## 2024-04-08 PROCEDURE — 93005 ELECTROCARDIOGRAM TRACING: CPT | Performed by: INTERNAL MEDICINE

## 2024-04-08 PROCEDURE — 93010 ELECTROCARDIOGRAM REPORT: CPT | Mod: ,,, | Performed by: INTERNAL MEDICINE

## 2024-04-08 PROCEDURE — 85025 COMPLETE CBC W/AUTO DIFF WBC: CPT | Performed by: EMERGENCY MEDICINE

## 2024-04-08 PROCEDURE — 63600175 PHARM REV CODE 636 W HCPCS: Performed by: EMERGENCY MEDICINE

## 2024-04-08 PROCEDURE — 81001 URINALYSIS AUTO W/SCOPE: CPT | Performed by: EMERGENCY MEDICINE

## 2024-04-08 PROCEDURE — 80053 COMPREHEN METABOLIC PANEL: CPT | Performed by: EMERGENCY MEDICINE

## 2024-04-08 RX ORDER — PROCHLORPERAZINE MALEATE 5 MG
10 TABLET ORAL
Status: COMPLETED | OUTPATIENT
Start: 2024-04-08 | End: 2024-04-08

## 2024-04-08 RX ORDER — DIPHENHYDRAMINE HCL 25 MG
25 CAPSULE ORAL
Status: COMPLETED | OUTPATIENT
Start: 2024-04-08 | End: 2024-04-08

## 2024-04-08 RX ORDER — CLONIDINE HYDROCHLORIDE 0.1 MG/1
0.1 TABLET ORAL
Status: COMPLETED | OUTPATIENT
Start: 2024-04-08 | End: 2024-04-08

## 2024-04-08 RX ADMIN — CLONIDINE HYDROCHLORIDE 0.1 MG: 0.1 TABLET ORAL at 01:04

## 2024-04-08 RX ADMIN — DIPHENHYDRAMINE HYDROCHLORIDE 25 MG: 25 CAPSULE ORAL at 01:04

## 2024-04-08 RX ADMIN — PROCHLORPERAZINE MALEATE 10 MG: 5 TABLET ORAL at 01:04

## 2024-04-08 RX ADMIN — IOHEXOL 100 ML: 350 INJECTION, SOLUTION INTRAVENOUS at 03:04

## 2024-04-08 NOTE — ED PROVIDER NOTES
Encounter Date: 4/7/2024       History     Chief Complaint   Patient presents with    Headache     X2 days, worse today, headache switches from left side to right and intermittent lightheadedness    Nausea     Chief complaint is throbbing headache now in the left parietal area.  Earlier was in the right parietal area.  She does not usually get headaches but this headache has been fairly persistent for 2 days she states.  Moving from the right side of her head to the left side of her head.  She states it is pounding in nature.  She denies any trauma hitting her head recently.  She denies any fever chills.  No complaints of chest pain productive cough nausea vomiting diarrhea trouble urinating.  She did complain of slight 15 minute episode of the whole right arm tingling that was earlier in the day on Sunday.  He was completely resolved and she has not having no symptoms at the present time.  She has a history of anxiety depression diverticulitis hypertension reflux.        Review of patient's allergies indicates:   Allergen Reactions    Penicillins      Other reaction(s): Unknown  Other reaction(s): Angioedema    Codeine Hives     Other reaction(s): Unknown     Past Medical History:   Diagnosis Date    Carpal tunnel syndrome     Colon polyp     Diverticulitis     Diverticulosis     Encounter for blood transfusion     Esophageal ulcer     GERD (gastroesophageal reflux disease)     Hiatal hernia     Hypertension     Mild intermittent asthma, uncomplicated      Past Surgical History:   Procedure Laterality Date    ABDOMINAL SURGERY      bladder lift    APPENDECTOMY  ~2000    CARPAL TUNNEL RELEASE      [facility]    CHOLECYSTECTOMY  ~2000    COLONOSCOPY  05/16/2018    Dr. Ennis, sent for scanning: hemorrhoids, diverticulosis, 4 colon polyps removed    COLONOSCOPY  12/12/2012    Dr. Ennis, sent for scanning: hemorrhoids, diverticulosis, 3 colon polyps removed    COLONOSCOPY  09/13/2021    Dr. Ennis, sent for  "scanning: hemorrhoids, "a few 2 to 4 mm polyps in the rectum, removed with a jumbo cold forceps", diverticulosis, repeat in 5 years for surveillance    COLONOSCOPY N/A 9/7/2022    Procedure: COLONOSCOPY;  Surgeon: Migel Rivera MD;  Location: [facility];  Service: Endoscopy;  Laterality: N/A;    cystomoty      ESOPHAGOGASTRODUODENOSCOPY  05/16/2018    Dr. Ennis, sent for scanning: mild non-erosive gastritis, esophageal ulcer & erosion; mild esophagitis; hiatal hernia    ESOPHAGOGASTRODUODENOSCOPY  10/31/2018    Dr. Ennis, sent for scanning: small hiatal hernia    ESOPHAGOGASTRODUODENOSCOPY N/A 8/23/2022    Procedure: EGD (ESOPHAGOGASTRODUODENOSCOPY);  Surgeon: Migel Rivera MD;  Location: [facility] ENDO;  Service: Endoscopy;  Laterality: N/A;    HERNIA MESH REMOVAL N/A     HYSTERECTOMY      KNEE SURGERY      BL TKA    SHOULDER SURGERY      TSA right     Family History   Problem Relation Age of Onset    Prostate cancer Brother     Colon cancer Neg Hx     Stomach cancer Neg Hx     Esophageal cancer Neg Hx     Crohn's disease Neg Hx     Ulcerative colitis Neg Hx     Celiac disease Neg Hx      Social History     Tobacco Use    Smoking status: Never    Smokeless tobacco: Never   Substance Use Topics    Alcohol use: Never    Drug use: Never     Review of Systems   Constitutional:  Negative for chills and fever.   HENT:  Negative for ear pain, rhinorrhea and sore throat.    Eyes:  Negative for pain and visual disturbance.   Respiratory:  Negative for cough and shortness of breath.    Cardiovascular:  Negative for chest pain and palpitations.   Gastrointestinal:  Negative for abdominal pain, constipation, diarrhea, nausea and vomiting.   Genitourinary:  Negative for dysuria, frequency, hematuria and urgency.   Musculoskeletal:  Negative for back pain, joint swelling and myalgias.   Skin:  Negative for rash.   Neurological:  Positive for headaches. Negative for dizziness, seizures and weakness. "   Psychiatric/Behavioral:  Negative for dysphoric mood. The patient is not nervous/anxious.        Physical Exam     Initial Vitals [04/07/24 2049]   BP Pulse Resp Temp SpO2   (!) 155/95 80 16 98.2 °F (36.8 °C) 97 %      MAP       --         Physical Exam    Nursing note and vitals reviewed.  Constitutional: She appears well-developed and well-nourished.   HENT:   Head: Normocephalic and atraumatic.   Eyes: Conjunctivae, EOM and lids are normal. Pupils are equal, round, and reactive to light.   Neck: Trachea normal. Neck supple. No thyroid mass and no thyromegaly present.   Normal range of motion.  Cardiovascular:  Normal rate, regular rhythm and normal heart sounds.           Pulmonary/Chest: Effort normal and breath sounds normal.   Abdominal: Abdomen is soft. There is no abdominal tenderness.   Musculoskeletal:         General: Normal range of motion.      Cervical back: Normal range of motion and neck supple.     Neurological: She is alert and oriented to person, place, and time. She has normal strength and normal reflexes. No cranial nerve deficit or sensory deficit.   No pronator drift good cerebellar function no facial droop   Skin: Skin is warm and dry.   Psychiatric: She has a normal mood and affect. Her speech is normal and behavior is normal. Judgment and thought content normal.         ED Course   Procedures  Labs Reviewed   CBC W/ AUTO DIFFERENTIAL - Abnormal; Notable for the following components:       Result Value    Hemoglobin 11.8 (*)     Hematocrit 36.9 (*)     Gran % 28.4 (*)     Lymph % 58.2 (*)     All other components within normal limits   COMPREHENSIVE METABOLIC PANEL - Abnormal; Notable for the following components:    CO2 30 (*)     Alkaline Phosphatase 44 (*)     Anion Gap 6 (*)     All other components within normal limits   URINALYSIS, REFLEX TO URINE CULTURE - Abnormal; Notable for the following components:    Protein, UA Trace (*)     Leukocytes, UA 1+ (*)     All other components  within normal limits    Narrative:     Specimen Source->Urine   URINALYSIS MICROSCOPIC - Abnormal; Notable for the following components:    WBC, UA 10 (*)     Hyaline Casts, UA 4 (*)     All other components within normal limits    Narrative:     Specimen Source->Urine          Imaging Results              CT Head Without Contrast (Final result)  Result time 04/08/24 01:49:23      Final result by Davin Rodriguez MD (04/08/24 01:49:23)                   Impression:      No CT evidence of acute intracranial abnormality. If the patient's headaches are sufficiently clinically suspicious, associated with signs of elevated ICP, or focal neurologic deficits; further evaluation could be performed with MRI.    Chronic senescent and microvascular ischemic changes.      Electronically signed by: Davin Rodriguez MD  Date:    04/08/2024  Time:    01:49               Narrative:    EXAMINATION:  CT HEAD WITHOUT CONTRAST    CLINICAL HISTORY:  headache;    TECHNIQUE:  Low dose axial images were obtained through the head.  Coronal and sagittal reformations were also performed. Contrast was not administered.    COMPARISON:  MRI brain 03/02/2022, CT head 08/01/2016    FINDINGS:  Please note there is streak artifact related to external metallic densities limiting evaluation of the skull base.  There is generalized cerebral volume loss.  There is mild periventricular white matter hypoattenuation suggesting sequelae of chronic microvascular ischemic change.  No CT evidence of acute intracranial hemorrhage or midline shift.  Incidental note is made of empty sella configuration.  The basal cisterns are patent.  There are postoperative changes of the paranasal sinuses.  There is trace mucosal thickening of the anterior ethmoid air cells.  No air-fluid levels present within the paranasal sinuses or mastoid air cells.  There is hyperostosis frontalis.  No acute displaced calvarial fracture identified.                                        Medications   cloNIDine tablet 0.1 mg (0.1 mg Oral Given 4/8/24 0116)   prochlorperazine tablet 10 mg (10 mg Oral Given 4/8/24 0123)   diphenhydrAMINE capsule 25 mg (25 mg Oral Given 4/8/24 0116)     Medical Decision Making  Chief complaint was mild fleeting intermittent headache for 1 side of the head to the other.  Patient's blood pressure was elevated as well she got here.  She got medicine for headache and for blood pressure both of which improved head CT negative.  Patient feels better will be discharged home with instructions.    Amount and/or Complexity of Data Reviewed  Labs: ordered.  Radiology: ordered.    Risk  OTC drugs.  Prescription drug management.                                      Clinical Impression:  Final diagnoses:  [R51.9] Headache  [I10] Hypertension, unspecified type (Primary)          ED Disposition Condition    Discharge Stable          ED Prescriptions    None       Follow-up Information       Follow up With Specialties Details Why Contact Info    Rishi Mosher MD Family Medicine Schedule an appointment as soon as possible for a visit in 2 days  [address] [facility] [id number]  [phone]               Aydee Westbrook MD  04/08/24 0320       Aydee Westbrook MD  04/08/24 0320

## 2024-04-08 NOTE — ED NOTES
Pt states she wants to go home and [facility] MD aware. Vitals stable. Pt denies pain. Grandson at bedside to drive pt home.

## 2024-04-08 NOTE — DISCHARGE INSTRUCTIONS
Continue taking your regular medicines follow-up with your doctor.  Return for any severe headache lasting more than 5-10 minutes or any neurological symptoms including weakness to 1 side of the body facial droop one-sided body numbness.

## 2024-04-26 LAB
OHS QRS DURATION: 88 MS
OHS QTC CALCULATION: 374 MS

## 2024-07-10 DIAGNOSIS — G47.33 OBSTRUCTIVE SLEEP APNEA: ICD-10-CM

## 2024-07-10 DIAGNOSIS — G47.19 EXCESSIVE DAYTIME SLEEPINESS: Primary | ICD-10-CM

## 2024-07-10 DIAGNOSIS — R53.83 FATIGUE: ICD-10-CM

## 2024-07-10 DIAGNOSIS — R06.83 SNORING: ICD-10-CM

## 2024-10-09 ENCOUNTER — TELEPHONE (OUTPATIENT)
Dept: UROLOGY | Facility: CLINIC | Age: 69
End: 2024-10-09
Payer: MEDICARE

## 2024-10-09 ENCOUNTER — NURSE TRIAGE (OUTPATIENT)
Dept: ADMINISTRATIVE | Facility: CLINIC | Age: 69
End: 2024-10-09
Payer: MEDICARE

## 2024-10-09 DIAGNOSIS — M85.88 OTHER SPECIFIED DISORDERS OF BONE DENSITY AND STRUCTURE, OTHER SITE: ICD-10-CM

## 2024-10-09 DIAGNOSIS — Z13.820 OSTEOPOROSIS SCREENING: Primary | ICD-10-CM

## 2024-10-09 NOTE — TELEPHONE ENCOUNTER
Call placed to inform patient the soonest appt can be made with the nurse pracitioner, no answer, message left with call back number.

## 2024-10-09 NOTE — TELEPHONE ENCOUNTER
----- Message from Monique sent at 10/9/2024 11:23 AM CDT -----  Contact: PT  Type:  Apoointment Request    Name of Caller:PT   When is the first available appointment?N/A  Symptoms: URGENCY / UNABLE TO HOLD URINE  Symptom: Urine Symptoms  Outcome: Schedule a same-day appointment or talk to a nurse or provider within 1 hour.  Reason: Caller denied all higher acuity questions  The caller accepted this outcome.   Would the patient rather a call back or a response via [facility]? CALL  Best Call Back Number:[phone].  Additional Information: PT PCP [facility] REFERRED PT TO [facility]  PT TRANS TO [facility] ON CALL NURSE  THANK YOU

## 2024-10-14 ENCOUNTER — TELEPHONE (OUTPATIENT)
Dept: UROLOGY | Facility: CLINIC | Age: 69
End: 2024-10-14
Payer: MEDICARE

## 2024-10-14 NOTE — TELEPHONE ENCOUNTER
----- Message from Margret sent at 10/14/2024  9:04 AM CDT -----  Type: Appointment Request     Name of Caller: KT UREÑA [[id number]]    When is the first available appointment? Do not have access    Reason for Visit:  Bladder problem     Best Call Back Number:  [phone]    Additional Information: Pt is requesting to reschedule her appt she has today

## 2025-02-10 DIAGNOSIS — E04.1 LEFT THYROID NODULE: Primary | ICD-10-CM

## 2025-07-09 DIAGNOSIS — M79.604 PAIN OF RIGHT LOWER EXTREMITY: Primary | ICD-10-CM

## 2025-07-09 DIAGNOSIS — M79.89 SWOLLEN LEG: ICD-10-CM
